# Patient Record
Sex: FEMALE | Race: WHITE | NOT HISPANIC OR LATINO | Employment: OTHER | URBAN - METROPOLITAN AREA
[De-identification: names, ages, dates, MRNs, and addresses within clinical notes are randomized per-mention and may not be internally consistent; named-entity substitution may affect disease eponyms.]

---

## 2017-10-12 ENCOUNTER — HOSPITAL ENCOUNTER (EMERGENCY)
Facility: HOSPITAL | Age: 82
Discharge: HOME | End: 2017-10-12
Attending: EMERGENCY MEDICINE
Payer: MEDICARE

## 2017-10-12 VITALS
RESPIRATION RATE: 18 BRPM | HEART RATE: 86 BPM | WEIGHT: 106 LBS | HEIGHT: 56 IN | SYSTOLIC BLOOD PRESSURE: 168 MMHG | DIASTOLIC BLOOD PRESSURE: 81 MMHG | BODY MASS INDEX: 23.84 KG/M2 | OXYGEN SATURATION: 97 % | TEMPERATURE: 98 F

## 2017-10-12 DIAGNOSIS — R41.82 ALTERED MENTAL STATUS: ICD-10-CM

## 2017-10-12 DIAGNOSIS — N30.01 ACUTE CYSTITIS WITH HEMATURIA: Primary | ICD-10-CM

## 2017-10-12 LAB
ALBUMIN SERPL BCP-MCNC: 3.4 G/DL
ALP SERPL-CCNC: 89 U/L
ALT SERPL W/O P-5'-P-CCNC: 8 U/L
ANION GAP SERPL CALC-SCNC: 11 MMOL/L
AST SERPL-CCNC: 11 U/L
BACTERIA #/AREA URNS HPF: ABNORMAL /HPF
BASOPHILS # BLD AUTO: 0.09 K/UL
BASOPHILS NFR BLD: 0.7 %
BILIRUB SERPL-MCNC: 0.4 MG/DL
BILIRUB UR QL STRIP: NEGATIVE
BUN SERPL-MCNC: 25 MG/DL
CALCIUM SERPL-MCNC: 9.9 MG/DL
CHLORIDE SERPL-SCNC: 94 MMOL/L
CK MB SERPL-MCNC: 0.5 NG/ML
CK MB SERPL-RTO: 2.4 %
CK SERPL-CCNC: 21 U/L
CK SERPL-CCNC: 21 U/L
CLARITY UR: ABNORMAL
CO2 SERPL-SCNC: 25 MMOL/L
COLOR UR: YELLOW
CREAT SERPL-MCNC: 1.4 MG/DL
DIFFERENTIAL METHOD: ABNORMAL
EOSINOPHIL # BLD AUTO: 0.1 K/UL
EOSINOPHIL NFR BLD: 0.5 %
ERYTHROCYTE [DISTWIDTH] IN BLOOD BY AUTOMATED COUNT: 15.2 %
EST. GFR  (AFRICAN AMERICAN): 41 ML/MIN/1.73 M^2
EST. GFR  (NON AFRICAN AMERICAN): 35 ML/MIN/1.73 M^2
GLUCOSE SERPL-MCNC: 144 MG/DL
GLUCOSE UR QL STRIP: NEGATIVE
HCT VFR BLD AUTO: 35.5 %
HGB BLD-MCNC: 11.7 G/DL
HGB UR QL STRIP: ABNORMAL
HYALINE CASTS #/AREA URNS LPF: 0 /LPF
INR PPP: 1
KETONES UR QL STRIP: NEGATIVE
LACTATE SERPL-SCNC: 1.5 MMOL/L
LEUKOCYTE ESTERASE UR QL STRIP: ABNORMAL
LYMPHOCYTES # BLD AUTO: 1.8 K/UL
LYMPHOCYTES NFR BLD: 14.5 %
MCH RBC QN AUTO: 27.3 PG
MCHC RBC AUTO-ENTMCNC: 33 G/DL
MCV RBC AUTO: 83 FL
MICROSCOPIC COMMENT: ABNORMAL
MONOCYTES # BLD AUTO: 1.3 K/UL
MONOCYTES NFR BLD: 10.6 %
NEUTROPHILS # BLD AUTO: 9.2 K/UL
NEUTROPHILS NFR BLD: 73.7 %
NITRITE UR QL STRIP: NEGATIVE
PH UR STRIP: 7 [PH] (ref 5–8)
PLATELET # BLD AUTO: 326 K/UL
PMV BLD AUTO: 9.5 FL
POCT GLUCOSE: 203 MG/DL (ref 70–110)
POTASSIUM SERPL-SCNC: 3.8 MMOL/L
PROT SERPL-MCNC: 8.9 G/DL
PROT UR QL STRIP: ABNORMAL
PROTHROMBIN TIME: 10.7 SEC
RBC # BLD AUTO: 4.29 M/UL
RBC #/AREA URNS HPF: 12 /HPF (ref 0–4)
SODIUM SERPL-SCNC: 130 MMOL/L
SP GR UR STRIP: 1.01 (ref 1–1.03)
TROPONIN I SERPL DL<=0.01 NG/ML-MCNC: 0.01 NG/ML
URN SPEC COLLECT METH UR: ABNORMAL
UROBILINOGEN UR STRIP-ACNC: NEGATIVE EU/DL
WBC # BLD AUTO: 12.52 K/UL
WBC #/AREA URNS HPF: >100 /HPF (ref 0–5)
WBC CLUMPS URNS QL MICRO: ABNORMAL
YEAST URNS QL MICRO: ABNORMAL

## 2017-10-12 PROCEDURE — 80053 COMPREHEN METABOLIC PANEL: CPT

## 2017-10-12 PROCEDURE — 87086 URINE CULTURE/COLONY COUNT: CPT

## 2017-10-12 PROCEDURE — 99284 EMERGENCY DEPT VISIT MOD MDM: CPT | Mod: 25

## 2017-10-12 PROCEDURE — 87077 CULTURE AEROBIC IDENTIFY: CPT

## 2017-10-12 PROCEDURE — 85610 PROTHROMBIN TIME: CPT

## 2017-10-12 PROCEDURE — 87040 BLOOD CULTURE FOR BACTERIA: CPT | Mod: 59

## 2017-10-12 PROCEDURE — 82553 CREATINE MB FRACTION: CPT

## 2017-10-12 PROCEDURE — 83605 ASSAY OF LACTIC ACID: CPT

## 2017-10-12 PROCEDURE — 81000 URINALYSIS NONAUTO W/SCOPE: CPT

## 2017-10-12 PROCEDURE — 82962 GLUCOSE BLOOD TEST: CPT

## 2017-10-12 PROCEDURE — 93010 ELECTROCARDIOGRAM REPORT: CPT | Mod: ,,, | Performed by: INTERNAL MEDICINE

## 2017-10-12 PROCEDURE — 85025 COMPLETE CBC W/AUTO DIFF WBC: CPT

## 2017-10-12 PROCEDURE — 87186 SC STD MICRODIL/AGAR DIL: CPT | Mod: 59

## 2017-10-12 PROCEDURE — 25000003 PHARM REV CODE 250: Performed by: EMERGENCY MEDICINE

## 2017-10-12 PROCEDURE — 87088 URINE BACTERIA CULTURE: CPT

## 2017-10-12 PROCEDURE — 84484 ASSAY OF TROPONIN QUANT: CPT

## 2017-10-12 PROCEDURE — 93005 ELECTROCARDIOGRAM TRACING: CPT

## 2017-10-12 RX ORDER — FLUTICASONE FUROATE AND VILANTEROL 200; 25 UG/1; UG/1
1 POWDER RESPIRATORY (INHALATION)
Status: ON HOLD | COMMUNITY
Start: 2017-05-16 | End: 2019-11-19

## 2017-10-12 RX ORDER — FERROUS SULFATE 324(65)MG
324 TABLET, DELAYED RELEASE (ENTERIC COATED) ORAL 2 TIMES DAILY
Status: ON HOLD | COMMUNITY
Start: 2017-07-13 | End: 2019-11-19

## 2017-10-12 RX ORDER — BENZONATATE 200 MG/1
CAPSULE ORAL
Status: ON HOLD | COMMUNITY
Start: 2017-02-20 | End: 2017-10-16 | Stop reason: HOSPADM

## 2017-10-12 RX ORDER — ALBUTEROL SULFATE 0.83 MG/ML
2.5 SOLUTION RESPIRATORY (INHALATION)
Status: ON HOLD | COMMUNITY
Start: 2016-10-18 | End: 2019-11-19

## 2017-10-12 RX ORDER — CIPROFLOXACIN 250 MG/1
250 TABLET, FILM COATED ORAL 2 TIMES DAILY
Qty: 10 TABLET | Refills: 0 | Status: SHIPPED | OUTPATIENT
Start: 2017-10-12 | End: 2017-10-13

## 2017-10-12 RX ORDER — WARFARIN SODIUM 5 MG/1
TABLET ORAL
COMMUNITY
Start: 2017-06-14 | End: 2017-10-13

## 2017-10-12 RX ORDER — HYOSCYAMINE SULFATE 0.12 MG/1
0.12 TABLET SUBLINGUAL
Status: COMPLETED | OUTPATIENT
Start: 2017-10-12 | End: 2017-10-12

## 2017-10-12 RX ORDER — CARVEDILOL 6.25 MG/1
6.25 TABLET ORAL
Status: ON HOLD | COMMUNITY
Start: 2017-07-13 | End: 2019-11-19

## 2017-10-12 RX ORDER — DARIFENACIN 7.5 MG/1
7.5 TABLET, EXTENDED RELEASE ORAL
COMMUNITY
Start: 2017-08-25 | End: 2017-10-18 | Stop reason: SDUPTHER

## 2017-10-12 RX ORDER — TERCONAZOLE 4 MG/G
1 CREAM VAGINAL
COMMUNITY
Start: 2017-10-10 | End: 2017-10-17

## 2017-10-12 RX ORDER — TOBRAMYCIN AND DEXAMETHASONE 3; 1 MG/ML; MG/ML
SUSPENSION/ DROPS OPHTHALMIC
COMMUNITY
Start: 2017-07-07 | End: 2017-10-18

## 2017-10-12 RX ORDER — ASPIRIN 81 MG/1
81 TABLET ORAL DAILY
COMMUNITY

## 2017-10-12 RX ORDER — HYOSCYAMINE SULFATE 0.12 MG/1
TABLET SUBLINGUAL
Status: ON HOLD | COMMUNITY
Start: 2017-04-03 | End: 2019-11-19

## 2017-10-12 RX ORDER — CIPROFLOXACIN 500 MG/1
500 TABLET ORAL
Status: COMPLETED | OUTPATIENT
Start: 2017-10-12 | End: 2017-10-12

## 2017-10-12 RX ADMIN — CIPROFLOXACIN HYDROCHLORIDE 500 MG: 500 TABLET, FILM COATED ORAL at 02:10

## 2017-10-12 RX ADMIN — HYOSCYAMINE SULFATE 0.12 MG: 0.12 TABLET ORAL at 03:10

## 2017-10-12 NOTE — ED PROVIDER NOTES
"SCRIBE #1 NOTE: I, Ana Maria Gonzalez, am scribing for, and in the presence of, Adelso Dove MD. I have scribed the entire note.      History      Chief Complaint   Patient presents with    Altered Mental Status     Daughter states,"SHe's started talking strange and I checked her sugar and it was high so the doctor told me to bring here to the ER.       Review of patient's allergies indicates:   Allergen Reactions    Demerol [meperidine]     Penicillins         HPI   HPI    10/12/2017, 12:36 PM   History obtained from the patient and family      History of Present Illness: Jessica Hamm is a 82 y.o. female patient who presents to the Emergency Department for AMS (disorientated) which onset gradually this morning. Symptoms are intermittent and moderate in severity.  Family is unclear about how long the episodes last. No mitigating or exacerbating factors reported. Associated sxs include confusion, anxiety, and elevated blood sugar (296). Patient denies any fever, chills, HA, facial droop, weakness/numbness, CP, SOB, and all other sxs at this time. No prior Tx reported. No further complaints or concerns at this time.         Arrival mode: Personal vehicle      PCP: Milo Gil MD       Past Medical History:  Past Medical History:   Diagnosis Date    Cancer     Cervical    COPD (chronic obstructive pulmonary disease)     Diabetes mellitus     Hypertension     Stroke 1995       Past Surgical History:  Past Surgical History:   Procedure Laterality Date    BACK SURGERY      carpal tunnel Bilateral     CHOLECYSTECTOMY      HYSTERECTOMY      Knee arthroscopy Bilateral          Family History:  No family history on file.    Social History:  Social History     Social History Main Topics    Smoking status: Never Smoker    Smokeless tobacco: Never Used    Alcohol use No    Drug use: No    Sexual activity: Not on file       ROS   Review of Systems   Constitutional: Negative for chills and fever.    "     + hyperglycemia    HENT: Negative for sore throat.    Respiratory: Negative for chest tightness and shortness of breath.    Cardiovascular: Negative for chest pain.   Gastrointestinal: Negative for nausea.   Genitourinary: Negative for dysuria.   Musculoskeletal: Negative for back pain.   Skin: Negative for rash.   Neurological: Negative for weakness, numbness and headaches.   Hematological: Does not bruise/bleed easily.   Psychiatric/Behavioral: Positive for confusion. The patient is nervous/anxious.        Physical Exam      Initial Vitals [10/12/17 1212]   BP Pulse Resp Temp SpO2   (!) 157/74 85 20 98.3 °F (36.8 °C) 98 %      MAP       101.67          Physical Exam  Nursing Notes and Vital Signs Reviewed.  Constitutional: Patient is in no apparent distress. Well-developed and well-nourished.  Head: Atraumatic. Normocephalic.  Eyes: PERRL. EOM intact. Conjunctivae are not pale. No scleral icterus.  ENT: Mucous membranes are moist. Oropharynx is clear and symmetric.    Neck: Supple. Full ROM. No lymphadenopathy.  Cardiovascular: Regular rate. Regular rhythm. No murmurs, rubs, or gallops. Distal pulses are 2+ and symmetric.  Pulmonary/Chest: No respiratory distress. Clear to auscultation bilaterally. No wheezing, rales, or rhonchi.  Abdominal: Soft and non-distended.  There is no tenderness.  No rebound, guarding, or rigidity. Good bowel sounds.  Musculoskeletal: Moves all extremities. No obvious deformities. No edema. No calf tenderness.  Skin: Warm and dry.  Neurological:  Mildly confused from baseline.  Pupils ERRL and EOM normal. Cranial nerves II-XII are intact. Strength is full bilaterally; it is equal and 5/5 in bilateral upper and lower extremities. There is no pronator drift of outstretched arms. Light touch sense is intact. Speech is clear and normal. No acute focal neurological deficits noted.  Psychiatric: Normal affect. Good eye contact. Appropriate in content.    ED Course    Procedures  ED Vital  "Signs:  Vitals:    10/12/17 1212   BP: (!) 157/74   Pulse: 85   Resp: 20   Temp: 98.3 °F (36.8 °C)   TempSrc: Oral   SpO2: 98%   Weight: 48.1 kg (106 lb)   Height: 4' 8" (1.422 m)       Abnormal Lab Results:  Labs Reviewed   CBC W/ AUTO DIFFERENTIAL - Abnormal; Notable for the following:        Result Value    Hemoglobin 11.7 (*)     Hematocrit 35.5 (*)     RDW 15.2 (*)     Gran # 9.2 (*)     Mono # 1.3 (*)     Gran% 73.7 (*)     Lymph% 14.5 (*)     All other components within normal limits   COMPREHENSIVE METABOLIC PANEL - Abnormal; Notable for the following:     Sodium 130 (*)     Chloride 94 (*)     Glucose 144 (*)     BUN, Bld 25 (*)     Total Protein 8.9 (*)     Albumin 3.4 (*)     ALT 8 (*)     eGFR if  41 (*)     eGFR if non  35 (*)     All other components within normal limits   URINALYSIS - Abnormal; Notable for the following:     Appearance, UA Hazy (*)     Protein, UA 1+ (*)     Occult Blood UA 3+ (*)     Leukocytes, UA 3+ (*)     All other components within normal limits   URINALYSIS MICROSCOPIC - Abnormal; Notable for the following:     RBC, UA 12 (*)     WBC, UA >100 (*)     WBC Clumps, UA Occasional (*)     Yeast, UA Rare (*)     All other components within normal limits   POCT GLUCOSE - Abnormal; Notable for the following:     POCT Glucose 203 (*)     All other components within normal limits   CULTURE, BLOOD   CULTURE, BLOOD   CULTURE, URINE   PROTIME-INR   TROPONIN I   CK-MB   CK   LACTIC ACID, PLASMA   POCT GLUCOSE MONITORING CONTINUOUS        All Lab Results:  Results for orders placed or performed during the hospital encounter of 10/12/17   CBC auto differential   Result Value Ref Range    WBC 12.52 3.90 - 12.70 K/uL    RBC 4.29 4.00 - 5.40 M/uL    Hemoglobin 11.7 (L) 12.0 - 16.0 g/dL    Hematocrit 35.5 (L) 37.0 - 48.5 %    MCV 83 82 - 98 fL    MCH 27.3 27.0 - 31.0 pg    MCHC 33.0 32.0 - 36.0 g/dL    RDW 15.2 (H) 11.5 - 14.5 %    Platelets 326 150 - 350 K/uL    " MPV 9.5 9.2 - 12.9 fL    Gran # 9.2 (H) 1.8 - 7.7 K/uL    Lymph # 1.8 1.0 - 4.8 K/uL    Mono # 1.3 (H) 0.3 - 1.0 K/uL    Eos # 0.1 0.0 - 0.5 K/uL    Baso # 0.09 0.00 - 0.20 K/uL    Gran% 73.7 (H) 38.0 - 73.0 %    Lymph% 14.5 (L) 18.0 - 48.0 %    Mono% 10.6 4.0 - 15.0 %    Eosinophil% 0.5 0.0 - 8.0 %    Basophil% 0.7 0.0 - 1.9 %    Differential Method Automated    Comprehensive metabolic panel   Result Value Ref Range    Sodium 130 (L) 136 - 145 mmol/L    Potassium 3.8 3.5 - 5.1 mmol/L    Chloride 94 (L) 95 - 110 mmol/L    CO2 25 23 - 29 mmol/L    Glucose 144 (H) 70 - 110 mg/dL    BUN, Bld 25 (H) 8 - 23 mg/dL    Creatinine 1.4 0.5 - 1.4 mg/dL    Calcium 9.9 8.7 - 10.5 mg/dL    Total Protein 8.9 (H) 6.0 - 8.4 g/dL    Albumin 3.4 (L) 3.5 - 5.2 g/dL    Total Bilirubin 0.4 0.1 - 1.0 mg/dL    Alkaline Phosphatase 89 55 - 135 U/L    AST 11 10 - 40 U/L    ALT 8 (L) 10 - 44 U/L    Anion Gap 11 8 - 16 mmol/L    eGFR if African American 41 (A) >60 mL/min/1.73 m^2    eGFR if non African American 35 (A) >60 mL/min/1.73 m^2   Urinalysis   Result Value Ref Range    Specimen UA Urine, Catheterized     Color, UA Yellow Yellow, Straw, Jesi    Appearance, UA Hazy (A) Clear    pH, UA 7.0 5.0 - 8.0    Specific Gravity, UA 1.010 1.005 - 1.030    Protein, UA 1+ (A) Negative    Glucose, UA Negative Negative    Ketones, UA Negative Negative    Bilirubin (UA) Negative Negative    Occult Blood UA 3+ (A) Negative    Nitrite, UA Negative Negative    Urobilinogen, UA Negative <2.0 EU/dL    Leukocytes, UA 3+ (A) Negative   Protime-INR   Result Value Ref Range    Prothrombin Time 10.7 9.0 - 12.5 sec    INR 1.0 0.8 - 1.2   Troponin I   Result Value Ref Range    Troponin I 0.006 0.000 - 0.026 ng/mL   CK-MB   Result Value Ref Range    CPK 21 20 - 180 U/L    CPK MB 0.5 0.1 - 6.5 ng/mL    MB% 2.4 0.0 - 5.0 %   CK   Result Value Ref Range    CPK 21 20 - 180 U/L   Lactic acid, plasma   Result Value Ref Range    Lactate (Lactic Acid) 1.5 0.5 - 2.2 mmol/L    Urinalysis Microscopic   Result Value Ref Range    RBC, UA 12 (H) 0 - 4 /hpf    WBC, UA >100 (H) 0 - 5 /hpf    WBC Clumps, UA Occasional (A) None-Rare    Bacteria, UA Rare None-Occ /hpf    Yeast, UA Rare (A) None    Hyaline Casts, UA 0 0-1/lpf /lpf    Microscopic Comment SEE COMMENT    POCT glucose   Result Value Ref Range    POCT Glucose 203 (H) 70 - 110 mg/dL         Imaging Results:  Imaging Results          CT Head Without Contrast (Final result)  Result time 10/12/17 14:57:05    Final result by Kendrick Edmond MD (10/12/17 14:57:05)                 Impression:      Moderately advanced atrophy.  Old right lacunar infarcts.  No definite acute process.    Mild scattered paranasal sinus mucosal thickening.        All CT scans at this facility use dose modulation, iterative reconstruction and/or weight based dosing when appropriate to reduce radiation dose to as low as reasonably achievable.       Electronically signed by: KENDRICK EDMOND MD  Date:     10/12/17  Time:    14:57              Narrative:    CT HEAD WITHOUT CONTRAST     History:  Altered mental status.  Confusion.  Slurred speech.    Technique:  Noncontrast CT of the brain.     Comparison:  None.     Findings:  The ventricles are dilated consistent with generalized atrophy. Associated age-related white matter degeneration is present.     Several chronic appearing right basal ganglia lacunar type infarcts are present.    Prominent cranial vascular calcification is noted.    No significant acute findings are noted.                             X-Ray Chest 1 View (Final result)  Result time 10/12/17 12:55:14    Final result by MARIZA Hoffmann Sr., MD (10/12/17 12:55:14)                 Impression:        1. There has been interval development of a mild amount of interstitial opacities seen in both lungs. This is characteristic of pulmonary edema.  2. There are surgical clips projected over the right upper quadrant of the abdomen. This is  characteristic of a prior cholecystectomy.  3. The size of the heart is prominent. This may be secondary to magnification.      Electronically signed by: MARIZA SUTHERLAND MD  Date:     10/12/17  Time:    12:55              Narrative:    One-view chest x-ray    Clinical History: Altered mental status, unspecified    Finding: Comparison was made to a prior examination performed on 8/11/2016. The size of the heart is prominent. There has been interval development of a mild amount of interstitial opacities seen in both lungs. There is no pneumothorax.  The costophrenic angles are sharp. There are surgical clips projected over the right upper quadrant of the abdomen.                                  The EKG was ordered, reviewed, and independently interpreted by the ED provider.  Interpretation time: 1248  Rate: 81 BPM  Rhythm: normal sinus rhythm  Interpretation: L axis deviation. No STEMI.      The Emergency Provider reviewed the vital signs and test results, which are outlined above.    ED Discussion   3:03 PM: Reassessed pt at this time.  Pt is A&Ox4 and in NAD. Pt states her condition has improved at this time. Discussed with pt all pertinent ED information and results. Discussed pt dx and plan of tx. Gave pt all f/u and return to the ED instructions. All questions and concerns were addressed at this time. Pt expresses understanding of information and instructions, and is comfortable with plan to discharge. Pt is stable for discharge.      ED Medication(s):  Medications   hyoscyamine SL tablet 0.125 mg (not administered)   ciprofloxacin HCl tablet 500 mg (500 mg Oral Given 10/12/17 1445)       New Prescriptions    No medications on file             Medical Decision Making    Medical Decision Making:   Clinical Tests:   Lab Tests: Reviewed and Ordered  Radiological Study: Reviewed and Ordered  Medical Tests: Ordered and Reviewed           Scribe Attestation:   Scribe #1: I performed the above scribed service and the  documentation accurately describes the services I performed. I attest to the accuracy of the note.    Attending:   Physician Attestation Statement for Scribe #1: I, Adelso Dove MD, personally performed the services described in this documentation, as scribed by Ana Maria Gonzalez, in my presence, and it is both accurate and complete.          Clinical Impression       ICD-10-CM ICD-9-CM   1. Altered mental status R41.82 780.97       Disposition:   Disposition: Discharged  Condition: Stable         Adelso Dove MD  10/12/17 1527       Adelso Dove MD  10/12/17 1387

## 2017-10-13 ENCOUNTER — HOSPITAL ENCOUNTER (INPATIENT)
Facility: HOSPITAL | Age: 82
LOS: 3 days | Discharge: HOME | DRG: 698 | End: 2017-10-16
Attending: EMERGENCY MEDICINE | Admitting: INTERNAL MEDICINE
Payer: MEDICARE

## 2017-10-13 ENCOUNTER — TELEPHONE (OUTPATIENT)
Dept: EMERGENCY MEDICINE | Facility: HOSPITAL | Age: 82
End: 2017-10-13

## 2017-10-13 DIAGNOSIS — R41.82 ALTERED MENTAL STATUS, UNSPECIFIED ALTERED MENTAL STATUS TYPE: ICD-10-CM

## 2017-10-13 DIAGNOSIS — T83.511D URINARY TRACT INFECTION ASSOCIATED WITH CATHETERIZATION OF URINARY TRACT, UNSPECIFIED INDWELLING URINARY CATHETER TYPE, SUBSEQUENT ENCOUNTER: Primary | ICD-10-CM

## 2017-10-13 DIAGNOSIS — A41.50 SEPSIS DUE TO GRAM NEGATIVE BACTERIA: ICD-10-CM

## 2017-10-13 DIAGNOSIS — N39.0 URINARY TRACT INFECTION ASSOCIATED WITH CATHETERIZATION OF URINARY TRACT, UNSPECIFIED INDWELLING URINARY CATHETER TYPE, SUBSEQUENT ENCOUNTER: Primary | ICD-10-CM

## 2017-10-13 PROBLEM — I10 ESSENTIAL HYPERTENSION: Status: ACTIVE | Noted: 2017-10-13

## 2017-10-13 PROBLEM — T83.518A INFECTION DUE TO URETHRAL CATHETER: Status: ACTIVE | Noted: 2017-10-13

## 2017-10-13 PROBLEM — I63.9 CVA (CEREBRAL VASCULAR ACCIDENT): Status: ACTIVE | Noted: 2017-10-13

## 2017-10-13 PROBLEM — E11.69 TYPE 2 DIABETES MELLITUS WITH OTHER SPECIFIED COMPLICATION: Status: ACTIVE | Noted: 2017-10-13

## 2017-10-13 PROBLEM — J44.9 COPD (CHRONIC OBSTRUCTIVE PULMONARY DISEASE): Status: ACTIVE | Noted: 2017-10-13

## 2017-10-13 PROBLEM — R78.81 GRAM-NEGATIVE BACTEREMIA: Status: ACTIVE | Noted: 2017-10-13

## 2017-10-13 LAB
ANION GAP SERPL CALC-SCNC: 14 MMOL/L
BACTERIA #/AREA URNS HPF: ABNORMAL /HPF
BASOPHILS # BLD AUTO: 0.08 K/UL
BASOPHILS NFR BLD: 0.5 %
BILIRUB UR QL STRIP: NEGATIVE
BUN SERPL-MCNC: 28 MG/DL
CALCIUM SERPL-MCNC: 9.9 MG/DL
CHLORIDE SERPL-SCNC: 93 MMOL/L
CLARITY UR: ABNORMAL
CO2 SERPL-SCNC: 22 MMOL/L
COLOR UR: YELLOW
CREAT SERPL-MCNC: 1.3 MG/DL
DIFFERENTIAL METHOD: ABNORMAL
EOSINOPHIL # BLD AUTO: 0.2 K/UL
EOSINOPHIL NFR BLD: 1.2 %
ERYTHROCYTE [DISTWIDTH] IN BLOOD BY AUTOMATED COUNT: 15.1 %
EST. GFR  (AFRICAN AMERICAN): 44 ML/MIN/1.73 M^2
EST. GFR  (NON AFRICAN AMERICAN): 39 ML/MIN/1.73 M^2
GLUCOSE SERPL-MCNC: 113 MG/DL
GLUCOSE UR QL STRIP: NEGATIVE
HCT VFR BLD AUTO: 33.7 %
HGB BLD-MCNC: 11.1 G/DL
HGB UR QL STRIP: ABNORMAL
HYALINE CASTS #/AREA URNS LPF: 0 /LPF
KETONES UR QL STRIP: NEGATIVE
LACTATE SERPL-SCNC: 1.3 MMOL/L
LEUKOCYTE ESTERASE UR QL STRIP: ABNORMAL
LYMPHOCYTES # BLD AUTO: 1.9 K/UL
LYMPHOCYTES NFR BLD: 13.1 %
MCH RBC QN AUTO: 27.1 PG
MCHC RBC AUTO-ENTMCNC: 32.9 G/DL
MCV RBC AUTO: 82 FL
MICROSCOPIC COMMENT: ABNORMAL
MONOCYTES # BLD AUTO: 1.9 K/UL
MONOCYTES NFR BLD: 12.8 %
NEUTROPHILS # BLD AUTO: 10.7 K/UL
NEUTROPHILS NFR BLD: 72.4 %
NITRITE UR QL STRIP: NEGATIVE
PH UR STRIP: 7 [PH] (ref 5–8)
PLATELET # BLD AUTO: 333 K/UL
PMV BLD AUTO: 9.6 FL
POCT GLUCOSE: 176 MG/DL (ref 70–110)
POTASSIUM SERPL-SCNC: 3.9 MMOL/L
PROT UR QL STRIP: ABNORMAL
RBC # BLD AUTO: 4.1 M/UL
RBC #/AREA URNS HPF: 20 /HPF (ref 0–4)
SODIUM SERPL-SCNC: 129 MMOL/L
SP GR UR STRIP: <=1.005 (ref 1–1.03)
URN SPEC COLLECT METH UR: ABNORMAL
UROBILINOGEN UR STRIP-ACNC: NEGATIVE EU/DL
WBC # BLD AUTO: 14.81 K/UL
WBC #/AREA URNS HPF: >100 /HPF (ref 0–5)
YEAST URNS QL MICRO: ABNORMAL

## 2017-10-13 PROCEDURE — 94640 AIRWAY INHALATION TREATMENT: CPT

## 2017-10-13 PROCEDURE — 96372 THER/PROPH/DIAG INJ SC/IM: CPT | Mod: 59

## 2017-10-13 PROCEDURE — 11000001 HC ACUTE MED/SURG PRIVATE ROOM

## 2017-10-13 PROCEDURE — 99285 EMERGENCY DEPT VISIT HI MDM: CPT | Mod: 25

## 2017-10-13 PROCEDURE — 85025 COMPLETE CBC W/AUTO DIFF WBC: CPT

## 2017-10-13 PROCEDURE — 80048 BASIC METABOLIC PNL TOTAL CA: CPT

## 2017-10-13 PROCEDURE — 25000003 PHARM REV CODE 250: Performed by: NURSE PRACTITIONER

## 2017-10-13 PROCEDURE — 25000003 PHARM REV CODE 250: Performed by: EMERGENCY MEDICINE

## 2017-10-13 PROCEDURE — 96365 THER/PROPH/DIAG IV INF INIT: CPT

## 2017-10-13 PROCEDURE — 96361 HYDRATE IV INFUSION ADD-ON: CPT

## 2017-10-13 PROCEDURE — 63600175 PHARM REV CODE 636 W HCPCS: Performed by: EMERGENCY MEDICINE

## 2017-10-13 PROCEDURE — 87086 URINE CULTURE/COLONY COUNT: CPT

## 2017-10-13 PROCEDURE — 83605 ASSAY OF LACTIC ACID: CPT

## 2017-10-13 PROCEDURE — 25000242 PHARM REV CODE 250 ALT 637 W/ HCPCS: Performed by: NURSE PRACTITIONER

## 2017-10-13 PROCEDURE — 87088 URINE BACTERIA CULTURE: CPT

## 2017-10-13 PROCEDURE — 82962 GLUCOSE BLOOD TEST: CPT

## 2017-10-13 PROCEDURE — 83036 HEMOGLOBIN GLYCOSYLATED A1C: CPT

## 2017-10-13 PROCEDURE — 63600175 PHARM REV CODE 636 W HCPCS: Performed by: NURSE PRACTITIONER

## 2017-10-13 PROCEDURE — S0073 INJECTION, AZTREONAM, 500 MG: HCPCS | Performed by: EMERGENCY MEDICINE

## 2017-10-13 PROCEDURE — 87106 FUNGI IDENTIFICATION YEAST: CPT

## 2017-10-13 PROCEDURE — 87040 BLOOD CULTURE FOR BACTERIA: CPT | Mod: 59

## 2017-10-13 PROCEDURE — 96366 THER/PROPH/DIAG IV INF ADDON: CPT

## 2017-10-13 PROCEDURE — 81000 URINALYSIS NONAUTO W/SCOPE: CPT

## 2017-10-13 PROCEDURE — 96367 TX/PROPH/DG ADDL SEQ IV INF: CPT

## 2017-10-13 PROCEDURE — 96368 THER/DIAG CONCURRENT INF: CPT

## 2017-10-13 RX ORDER — METRONIDAZOLE 500 MG/100ML
500 INJECTION, SOLUTION INTRAVENOUS
Status: DISCONTINUED | OUTPATIENT
Start: 2017-10-13 | End: 2017-10-13 | Stop reason: SDUPTHER

## 2017-10-13 RX ORDER — HYOSCYAMINE SULFATE 0.12 MG/1
0.12 TABLET SUBLINGUAL EVERY 4 HOURS PRN
Status: DISCONTINUED | OUTPATIENT
Start: 2017-10-13 | End: 2017-10-16 | Stop reason: HOSPADM

## 2017-10-13 RX ORDER — METRONIDAZOLE 500 MG/100ML
1000 INJECTION, SOLUTION INTRAVENOUS ONCE
Status: DISCONTINUED | OUTPATIENT
Start: 2017-10-13 | End: 2017-10-13

## 2017-10-13 RX ORDER — CARVEDILOL 3.12 MG/1
6.25 TABLET ORAL 2 TIMES DAILY WITH MEALS
Status: DISCONTINUED | OUTPATIENT
Start: 2017-10-14 | End: 2017-10-16 | Stop reason: HOSPADM

## 2017-10-13 RX ORDER — BUDESONIDE 0.5 MG/2ML
0.5 INHALANT ORAL 2 TIMES DAILY
Status: DISCONTINUED | OUTPATIENT
Start: 2017-10-13 | End: 2017-10-16 | Stop reason: HOSPADM

## 2017-10-13 RX ORDER — GLUCAGON 1 MG
1 KIT INJECTION
Status: DISCONTINUED | OUTPATIENT
Start: 2017-10-13 | End: 2017-10-16 | Stop reason: HOSPADM

## 2017-10-13 RX ORDER — ASPIRIN 81 MG/1
81 TABLET ORAL DAILY
Status: DISCONTINUED | OUTPATIENT
Start: 2017-10-14 | End: 2017-10-16 | Stop reason: HOSPADM

## 2017-10-13 RX ORDER — AMLODIPINE BESYLATE 10 MG/1
10 TABLET ORAL DAILY
Status: DISCONTINUED | OUTPATIENT
Start: 2017-10-14 | End: 2017-10-16 | Stop reason: HOSPADM

## 2017-10-13 RX ORDER — INSULIN ASPART 100 [IU]/ML
0-5 INJECTION, SOLUTION INTRAVENOUS; SUBCUTANEOUS
Status: DISCONTINUED | OUTPATIENT
Start: 2017-10-13 | End: 2017-10-16 | Stop reason: HOSPADM

## 2017-10-13 RX ORDER — SERTRALINE HYDROCHLORIDE 50 MG/1
50 TABLET, FILM COATED ORAL DAILY
Status: DISCONTINUED | OUTPATIENT
Start: 2017-10-14 | End: 2017-10-16 | Stop reason: HOSPADM

## 2017-10-13 RX ORDER — IPRATROPIUM BROMIDE AND ALBUTEROL SULFATE 2.5; .5 MG/3ML; MG/3ML
3 SOLUTION RESPIRATORY (INHALATION)
Status: DISCONTINUED | OUTPATIENT
Start: 2017-10-13 | End: 2017-10-14

## 2017-10-13 RX ORDER — HEPARIN SODIUM 5000 [USP'U]/ML
5000 INJECTION, SOLUTION INTRAVENOUS; SUBCUTANEOUS EVERY 8 HOURS
Status: DISCONTINUED | OUTPATIENT
Start: 2017-10-13 | End: 2017-10-16 | Stop reason: HOSPADM

## 2017-10-13 RX ORDER — FORMOTEROL FUMARATE DIHYDRATE 20 UG/2ML
20 SOLUTION RESPIRATORY (INHALATION) EVERY 12 HOURS
Status: DISCONTINUED | OUTPATIENT
Start: 2017-10-14 | End: 2017-10-13

## 2017-10-13 RX ORDER — PANTOPRAZOLE SODIUM 40 MG/1
40 TABLET, DELAYED RELEASE ORAL DAILY
Status: DISCONTINUED | OUTPATIENT
Start: 2017-10-13 | End: 2017-10-16 | Stop reason: HOSPADM

## 2017-10-13 RX ORDER — IBUPROFEN 200 MG
16 TABLET ORAL
Status: DISCONTINUED | OUTPATIENT
Start: 2017-10-13 | End: 2017-10-16 | Stop reason: HOSPADM

## 2017-10-13 RX ORDER — ARFORMOTEROL TARTRATE 15 UG/2ML
15 SOLUTION RESPIRATORY (INHALATION) 2 TIMES DAILY
Status: DISCONTINUED | OUTPATIENT
Start: 2017-10-13 | End: 2017-10-16 | Stop reason: HOSPADM

## 2017-10-13 RX ORDER — IBUPROFEN 200 MG
24 TABLET ORAL
Status: DISCONTINUED | OUTPATIENT
Start: 2017-10-13 | End: 2017-10-16 | Stop reason: HOSPADM

## 2017-10-13 RX ORDER — SODIUM CHLORIDE 9 MG/ML
500 INJECTION, SOLUTION INTRAVENOUS
Status: COMPLETED | OUTPATIENT
Start: 2017-10-13 | End: 2017-10-13

## 2017-10-13 RX ORDER — FERROUS SULFATE 325(65) MG
324 TABLET, DELAYED RELEASE (ENTERIC COATED) ORAL 2 TIMES DAILY
Status: DISCONTINUED | OUTPATIENT
Start: 2017-10-13 | End: 2017-10-16 | Stop reason: HOSPADM

## 2017-10-13 RX ORDER — CIPROFLOXACIN 2 MG/ML
400 INJECTION, SOLUTION INTRAVENOUS
Status: COMPLETED | OUTPATIENT
Start: 2017-10-13 | End: 2017-10-13

## 2017-10-13 RX ORDER — SODIUM CHLORIDE 9 MG/ML
INJECTION, SOLUTION INTRAVENOUS CONTINUOUS
Status: DISCONTINUED | OUTPATIENT
Start: 2017-10-13 | End: 2017-10-16 | Stop reason: HOSPADM

## 2017-10-13 RX ORDER — METRONIDAZOLE 500 MG/100ML
500 INJECTION, SOLUTION INTRAVENOUS
Status: DISCONTINUED | OUTPATIENT
Start: 2017-10-14 | End: 2017-10-16 | Stop reason: HOSPADM

## 2017-10-13 RX ORDER — PRAVASTATIN SODIUM 10 MG/1
10 TABLET ORAL DAILY
Status: DISCONTINUED | OUTPATIENT
Start: 2017-10-14 | End: 2017-10-16 | Stop reason: HOSPADM

## 2017-10-13 RX ORDER — METRONIDAZOLE 500 MG/100ML
1000 INJECTION, SOLUTION INTRAVENOUS ONCE
Status: COMPLETED | OUTPATIENT
Start: 2017-10-14 | End: 2017-10-14

## 2017-10-13 RX ADMIN — SODIUM CHLORIDE 500 ML: 900 INJECTION, SOLUTION INTRAVENOUS at 05:10

## 2017-10-13 RX ADMIN — PANTOPRAZOLE SODIUM 40 MG: 40 TABLET, DELAYED RELEASE ORAL at 09:10

## 2017-10-13 RX ADMIN — FERROUS SULFATE TAB EC 325 MG (65 MG FE EQUIVALENT) 325 MG: 325 (65 FE) TABLET DELAYED RESPONSE at 11:10

## 2017-10-13 RX ADMIN — ARFORMOTEROL TARTRATE 15 MCG: 15 SOLUTION RESPIRATORY (INHALATION) at 09:10

## 2017-10-13 RX ADMIN — BUDESONIDE 0.5 MG: 0.5 SUSPENSION RESPIRATORY (INHALATION) at 09:10

## 2017-10-13 RX ADMIN — IPRATROPIUM BROMIDE AND ALBUTEROL SULFATE 3 ML: .5; 3 SOLUTION RESPIRATORY (INHALATION) at 09:10

## 2017-10-13 RX ADMIN — AZTREONAM 500 MG: 1 INJECTION, POWDER, LYOPHILIZED, FOR SOLUTION INTRAMUSCULAR; INTRAVENOUS at 09:10

## 2017-10-13 RX ADMIN — HEPARIN SODIUM 5000 UNITS: 5000 INJECTION, SOLUTION INTRAVENOUS; SUBCUTANEOUS at 09:10

## 2017-10-13 RX ADMIN — SODIUM CHLORIDE: 0.9 INJECTION, SOLUTION INTRAVENOUS at 09:10

## 2017-10-13 RX ADMIN — CIPROFLOXACIN 400 MG: 2 INJECTION, SOLUTION INTRAVENOUS at 05:10

## 2017-10-13 RX ADMIN — METRONIDAZOLE 1000 MG: 500 INJECTION, SOLUTION INTRAVENOUS at 11:10

## 2017-10-13 RX ADMIN — SODIUM CHLORIDE 1443 ML: 0.9 INJECTION, SOLUTION INTRAVENOUS at 08:10

## 2017-10-13 NOTE — TELEPHONE ENCOUNTER
----- Message from Sukhjinder Cash MD sent at 10/13/2017 11:19 AM CDT -----  Patient with positive blood culture and UTI.  Please have patient return to the ED for reevaluation.

## 2017-10-13 NOTE — ED PROVIDER NOTES
"SCRIBE #1 NOTE: I, Christina Phillips, am scribing for, and in the presence of, Adelso Dove MD. I have scribed the entire note.      History      Chief Complaint   Patient presents with    Urinary Tract Infection     pt called to come back to ED for positive blood cultures and UTI       Review of patient's allergies indicates:   Allergen Reactions    Demerol [meperidine] Rash    Penicillins Hives and Rash     "EVEN THE ROOF OF MY MOUTH BROKE OUT"        HPI   HPI    10/13/2017, 4:14 PM   History obtained from the family and patient      History of Present Illness: Jessica Hamm is a 82 y.o. female patient who was called and informed to come to ED for re-evaluation after her blood cultures from yesterday's ED visit resulted positive and for her UTI. Pt was seen in ED yesterday for confusion and blood cultures were obtained. She was dx with UTI and discharged with a Rx for Cipro. Patient states that she has lower back pain that extends to her L flank and had one episode of nausea in ED lobby. Family states that pt started taking her Cipro and still has some mild confusion. Per family, patient was on the side of the bed talking last night but no one else was in the room. They report that patient said she was talking to her  who was sleeping in another room at that time. Symptoms are constant and moderate in severity. No mitigating or exacerbating factors reported. Patient/family denies fever, chills, vomiting, diarrhea, constipation, dizziness, HA, light-headedness, facial asymmetry, slurred speech, and all other sxs at this time. No further complaints or concerns at this time.       Arrival mode: Personal vehicle    PCP: Milo Gil MD       Past Medical History:  Past Medical History:   Diagnosis Date    Cancer 1974    Cervical    COPD (chronic obstructive pulmonary disease)     Diabetes mellitus     Hypertension     Stroke 1995       Past Surgical History:  Past Surgical History: "   Procedure Laterality Date    BACK SURGERY      carpal tunnel Bilateral     CHOLECYSTECTOMY      HYSTERECTOMY      Knee arthroscopy Bilateral          Family History:  History reviewed. No pertinent family history.    Social History:  Social History     Social History Main Topics    Smoking status: Never Smoker    Smokeless tobacco: Never Used    Alcohol use No    Drug use: No    Sexual activity: Unknown       ROS   Review of Systems   Constitutional: Negative for chills, diaphoresis and fever.   HENT: Negative for sore throat.    Respiratory: Negative for shortness of breath.    Cardiovascular: Negative for chest pain.   Gastrointestinal: Positive for nausea (one episode). Negative for abdominal distention, blood in stool, constipation, diarrhea and vomiting.   Genitourinary: Negative for dysuria and hematuria.   Musculoskeletal: Positive for back pain. Negative for neck pain.   Skin: Negative for rash.   Neurological: Negative for dizziness, syncope, facial asymmetry, speech difficulty, weakness, light-headedness, numbness and headaches.   Hematological: Does not bruise/bleed easily.   Psychiatric/Behavioral: Positive for confusion (per family) and hallucinations. Negative for sleep disturbance.   All other systems reviewed and are negative.      Physical Exam      Initial Vitals [10/13/17 1428]   BP Pulse Resp Temp SpO2   (!) 150/80 82 18 98.2 °F (36.8 °C) 97 %      MAP       103.33          Physical Exam  Nursing Notes and Vital Signs Reviewed.  Constitutional: Patient is in no acute distress. Awake and alert. Well-developed and well-nourished.  Head: Atraumatic. Normocephalic.  Eyes: PERRL. EOM intact. Conjunctivae are not pale. No scleral icterus.  ENT: Mucous membranes are moist. Oropharynx is clear and symmetric.    Neck: Supple. Full ROM. No lymphadenopathy.  Cardiovascular: Regular rate. Regular rhythm. No murmurs, rubs, or gallops. Distal pulses are 2+ and symmetric.  Pulmonary/Chest: No  respiratory distress. Clear to auscultation bilaterally. No wheezing, rales, or rhonchi.  Abdominal: Soft and non-distended.  There is no tenderness.  No rebound, guarding, or rigidity.    : No CVA tenderness  Musculoskeletal: Moves all extremities. No obvious deformities. No edema. No calf tenderness. No back tenderness. No midline bony tenderness, deformities, or step-offs of the T-spine or L-spine.   Skin: Warm and dry.  Neurological:  Alert, awake, oriented, and appropriate. Normal speech. Grossly neurologically intact.   Psychiatric: Normal affect. Good eye contact. Appropriate in content.    ED Course    Procedures  ED Vital Signs:  Vitals:    10/13/17 1428 10/13/17 1752   BP: (!) 150/80 (!) 158/73   Pulse: 82 95   Resp: 18 18   Temp: 98.2 °F (36.8 °C) 98.8 °F (37.1 °C)   TempSrc: Oral Oral   SpO2: 97% 96%   Weight: 48.1 kg (106 lb)        Abnormal Lab Results:  Labs Reviewed   CBC W/ AUTO DIFFERENTIAL - Abnormal; Notable for the following:        Result Value    WBC 14.81 (*)     Hemoglobin 11.1 (*)     Hematocrit 33.7 (*)     RDW 15.1 (*)     Gran # 10.7 (*)     Mono # 1.9 (*)     Lymph% 13.1 (*)     All other components within normal limits   BASIC METABOLIC PANEL - Abnormal; Notable for the following:     Sodium 129 (*)     Chloride 93 (*)     CO2 22 (*)     Glucose 113 (*)     BUN, Bld 28 (*)     eGFR if  44 (*)     eGFR if non  39 (*)     All other components within normal limits   URINALYSIS - Abnormal; Notable for the following:     Appearance, UA Cloudy (*)     Specific Gravity, UA <=1.005 (*)     Protein, UA 1+ (*)     Occult Blood UA 3+ (*)     Leukocytes, UA 3+ (*)     All other components within normal limits   URINALYSIS MICROSCOPIC - Abnormal; Notable for the following:     RBC, UA 20 (*)     WBC, UA >100 (*)     Bacteria, UA Moderate (*)     Yeast, UA Many (*)     All other components within normal limits   CULTURE, BLOOD   CULTURE, BLOOD   CULTURE, URINE    LACTIC ACID, PLASMA        All Lab Results:  Results for orders placed or performed during the hospital encounter of 10/13/17   CBC auto differential   Result Value Ref Range    WBC 14.81 (H) 3.90 - 12.70 K/uL    RBC 4.10 4.00 - 5.40 M/uL    Hemoglobin 11.1 (L) 12.0 - 16.0 g/dL    Hematocrit 33.7 (L) 37.0 - 48.5 %    MCV 82 82 - 98 fL    MCH 27.1 27.0 - 31.0 pg    MCHC 32.9 32.0 - 36.0 g/dL    RDW 15.1 (H) 11.5 - 14.5 %    Platelets 333 150 - 350 K/uL    MPV 9.6 9.2 - 12.9 fL    Gran # 10.7 (H) 1.8 - 7.7 K/uL    Lymph # 1.9 1.0 - 4.8 K/uL    Mono # 1.9 (H) 0.3 - 1.0 K/uL    Eos # 0.2 0.0 - 0.5 K/uL    Baso # 0.08 0.00 - 0.20 K/uL    Gran% 72.4 38.0 - 73.0 %    Lymph% 13.1 (L) 18.0 - 48.0 %    Mono% 12.8 4.0 - 15.0 %    Eosinophil% 1.2 0.0 - 8.0 %    Basophil% 0.5 0.0 - 1.9 %    Differential Method Automated    Basic metabolic panel   Result Value Ref Range    Sodium 129 (L) 136 - 145 mmol/L    Potassium 3.9 3.5 - 5.1 mmol/L    Chloride 93 (L) 95 - 110 mmol/L    CO2 22 (L) 23 - 29 mmol/L    Glucose 113 (H) 70 - 110 mg/dL    BUN, Bld 28 (H) 8 - 23 mg/dL    Creatinine 1.3 0.5 - 1.4 mg/dL    Calcium 9.9 8.7 - 10.5 mg/dL    Anion Gap 14 8 - 16 mmol/L    eGFR if African American 44 (A) >60 mL/min/1.73 m^2    eGFR if non African American 39 (A) >60 mL/min/1.73 m^2   Lactic acid, plasma   Result Value Ref Range    Lactate (Lactic Acid) 1.3 0.5 - 2.2 mmol/L   Urinalysis   Result Value Ref Range    Specimen UA Urine, Catheterized     Color, UA Yellow Yellow, Straw, Jesi    Appearance, UA Cloudy (A) Clear    pH, UA 7.0 5.0 - 8.0    Specific Gravity, UA <=1.005 (A) 1.005 - 1.030    Protein, UA 1+ (A) Negative    Glucose, UA Negative Negative    Ketones, UA Negative Negative    Bilirubin (UA) Negative Negative    Occult Blood UA 3+ (A) Negative    Nitrite, UA Negative Negative    Urobilinogen, UA Negative <2.0 EU/dL    Leukocytes, UA 3+ (A) Negative   Urinalysis Microscopic   Result Value Ref Range    RBC, UA 20 (H) 0 - 4  /hpf    WBC, UA >100 (H) 0 - 5 /hpf    Bacteria, UA Moderate (A) None-Occ /hpf    Yeast, UA Many (A) None    Hyaline Casts, UA 0 0-1/lpf /lpf    Microscopic Comment SEE COMMENT      The Emergency Provider reviewed the vital signs and test results, which are outlined above.    ED Discussion     7:12 PM: Discussed case with Zenobia Lemons NP ([facility]).   Dr. Weiss agrees with current care and management of pt and accepts admission.   Admitting Service: Hospital medicine   Admitting Physician: Dr. Weiss  Admit to: Med-surg    7:26 PM: Re-evaluated pt. I have discussed test results, shared treatment plan, and the need for admission with patient and family at bedside. Pt and family express understanding at this time and agree with all information. All questions answered. Pt and family have no further questions or concerns at this time. Pt is ready for admit.    ED Medication(s):  Medications   0.9%  NaCl infusion (500 mLs Intravenous New Bag 10/13/17 8869)   ciprofloxacin (CIPRO)400mg/200ml D5W IVPB 400 mg (0 mg Intravenous Stopped 10/13/17 1846)       New Prescriptions    No medications on file            Medical Decision Making    Medical Decision Making:   Clinical Tests:   Lab Tests: Ordered and Reviewed           Scribe Attestation:   Scribe #1: I performed the above scribed service and the documentation accurately describes the services I performed. I attest to the accuracy of the note.    Attending:   Physician Attestation Statement for Scribe #1: I, Adelso Dove MD, personally performed the services described in this documentation, as scribed by Christina Phillips, in my presence, and it is both accurate and complete.          Clinical Impression     No diagnosis found.    Disposition:   Disposition: Admitted  Condition: Fair         Adelso Dove MD  10/13/17 7446

## 2017-10-13 NOTE — ED NOTES
Urine obtained from existing arroyo. Arroyo was clamped for 20 minutes and then urine was obtained from port after cleaning the hub with chloraprep.

## 2017-10-13 NOTE — TELEPHONE ENCOUNTER
Spoke to family member and she verbalized understanding to bring patient back to ED for reevaluation.

## 2017-10-14 LAB
BASOPHILS # BLD AUTO: 0.07 K/UL
BASOPHILS NFR BLD: 0.8 %
DIFFERENTIAL METHOD: ABNORMAL
EOSINOPHIL # BLD AUTO: 0.2 K/UL
EOSINOPHIL NFR BLD: 1.8 %
ERYTHROCYTE [DISTWIDTH] IN BLOOD BY AUTOMATED COUNT: 15.1 %
ESTIMATED AVG GLUCOSE: 151 MG/DL
HBA1C MFR BLD HPLC: 6.9 %
HCT VFR BLD AUTO: 30.4 %
HGB BLD-MCNC: 9.8 G/DL
LACTATE SERPL-SCNC: 0.7 MMOL/L
LACTATE SERPL-SCNC: 1.1 MMOL/L
LYMPHOCYTES # BLD AUTO: 1.4 K/UL
LYMPHOCYTES NFR BLD: 17.1 %
MCH RBC QN AUTO: 26.7 PG
MCHC RBC AUTO-ENTMCNC: 32.2 G/DL
MCV RBC AUTO: 83 FL
MONOCYTES # BLD AUTO: 0.9 K/UL
MONOCYTES NFR BLD: 11.2 %
NEUTROPHILS # BLD AUTO: 5.8 K/UL
NEUTROPHILS NFR BLD: 69.1 %
PLATELET # BLD AUTO: 309 K/UL
PMV BLD AUTO: 9.4 FL
POCT GLUCOSE: 166 MG/DL (ref 70–110)
POCT GLUCOSE: 259 MG/DL (ref 70–110)
RBC # BLD AUTO: 3.67 M/UL
WBC # BLD AUTO: 8.34 K/UL

## 2017-10-14 PROCEDURE — 85025 COMPLETE CBC W/AUTO DIFF WBC: CPT

## 2017-10-14 PROCEDURE — 25000003 PHARM REV CODE 250: Performed by: EMERGENCY MEDICINE

## 2017-10-14 PROCEDURE — 25000003 PHARM REV CODE 250: Performed by: NURSE PRACTITIONER

## 2017-10-14 PROCEDURE — 96372 THER/PROPH/DIAG INJ SC/IM: CPT

## 2017-10-14 PROCEDURE — 25000242 PHARM REV CODE 250 ALT 637 W/ HCPCS: Performed by: NURSE PRACTITIONER

## 2017-10-14 PROCEDURE — 63600175 PHARM REV CODE 636 W HCPCS: Performed by: EMERGENCY MEDICINE

## 2017-10-14 PROCEDURE — 94640 AIRWAY INHALATION TREATMENT: CPT

## 2017-10-14 PROCEDURE — 36415 COLL VENOUS BLD VENIPUNCTURE: CPT

## 2017-10-14 PROCEDURE — S0073 INJECTION, AZTREONAM, 500 MG: HCPCS | Performed by: EMERGENCY MEDICINE

## 2017-10-14 PROCEDURE — 11000001 HC ACUTE MED/SURG PRIVATE ROOM

## 2017-10-14 PROCEDURE — S0030 INJECTION, METRONIDAZOLE: HCPCS | Performed by: EMERGENCY MEDICINE

## 2017-10-14 PROCEDURE — S0030 INJECTION, METRONIDAZOLE: HCPCS | Performed by: NURSE PRACTITIONER

## 2017-10-14 PROCEDURE — 63600175 PHARM REV CODE 636 W HCPCS: Performed by: NURSE PRACTITIONER

## 2017-10-14 PROCEDURE — 83605 ASSAY OF LACTIC ACID: CPT

## 2017-10-14 PROCEDURE — 83605 ASSAY OF LACTIC ACID: CPT | Mod: 91

## 2017-10-14 PROCEDURE — 25000003 PHARM REV CODE 250

## 2017-10-14 RX ORDER — IPRATROPIUM BROMIDE AND ALBUTEROL SULFATE 2.5; .5 MG/3ML; MG/3ML
3 SOLUTION RESPIRATORY (INHALATION) EVERY 6 HOURS PRN
Status: DISCONTINUED | OUTPATIENT
Start: 2017-10-14 | End: 2017-10-16 | Stop reason: HOSPADM

## 2017-10-14 RX ORDER — OLANZAPINE 5 MG/1
5 TABLET ORAL ONCE
Status: COMPLETED | OUTPATIENT
Start: 2017-10-14 | End: 2017-10-14

## 2017-10-14 RX ORDER — ACETAMINOPHEN 325 MG/1
650 TABLET ORAL EVERY 6 HOURS PRN
Status: DISCONTINUED | OUTPATIENT
Start: 2017-10-14 | End: 2017-10-16 | Stop reason: HOSPADM

## 2017-10-14 RX ADMIN — OLANZAPINE 5 MG: 5 TABLET, FILM COATED ORAL at 09:10

## 2017-10-14 RX ADMIN — HEPARIN SODIUM 5000 UNITS: 5000 INJECTION, SOLUTION INTRAVENOUS; SUBCUTANEOUS at 01:10

## 2017-10-14 RX ADMIN — BUDESONIDE 0.5 MG: 0.5 SUSPENSION RESPIRATORY (INHALATION) at 07:10

## 2017-10-14 RX ADMIN — PRAVASTATIN SODIUM 10 MG: 10 TABLET ORAL at 09:10

## 2017-10-14 RX ADMIN — INSULIN ASPART 1 UNITS: 100 INJECTION, SOLUTION INTRAVENOUS; SUBCUTANEOUS at 09:10

## 2017-10-14 RX ADMIN — AMLODIPINE BESYLATE 10 MG: 10 TABLET ORAL at 08:10

## 2017-10-14 RX ADMIN — ACETAMINOPHEN 650 MG: 325 TABLET ORAL at 02:10

## 2017-10-14 RX ADMIN — METRONIDAZOLE 500 MG: 500 INJECTION, SOLUTION INTRAVENOUS at 03:10

## 2017-10-14 RX ADMIN — PANTOPRAZOLE SODIUM 40 MG: 40 TABLET, DELAYED RELEASE ORAL at 08:10

## 2017-10-14 RX ADMIN — FERROUS SULFATE TAB EC 325 MG (65 MG FE EQUIVALENT) 325 MG: 325 (65 FE) TABLET DELAYED RESPONSE at 09:10

## 2017-10-14 RX ADMIN — HEPARIN SODIUM 5000 UNITS: 5000 INJECTION, SOLUTION INTRAVENOUS; SUBCUTANEOUS at 09:10

## 2017-10-14 RX ADMIN — ARFORMOTEROL TARTRATE 15 MCG: 15 SOLUTION RESPIRATORY (INHALATION) at 09:10

## 2017-10-14 RX ADMIN — CARVEDILOL 6.25 MG: 3.12 TABLET, FILM COATED ORAL at 08:10

## 2017-10-14 RX ADMIN — HYOSCYAMINE SULFATE 0.12 MG: 0.12 TABLET ORAL at 05:10

## 2017-10-14 RX ADMIN — BUDESONIDE 0.5 MG: 0.5 SUSPENSION RESPIRATORY (INHALATION) at 09:10

## 2017-10-14 RX ADMIN — HEPARIN SODIUM 5000 UNITS: 5000 INJECTION, SOLUTION INTRAVENOUS; SUBCUTANEOUS at 05:10

## 2017-10-14 RX ADMIN — IPRATROPIUM BROMIDE AND ALBUTEROL SULFATE 3 ML: .5; 3 SOLUTION RESPIRATORY (INHALATION) at 01:10

## 2017-10-14 RX ADMIN — METRONIDAZOLE 500 MG: 500 INJECTION, SOLUTION INTRAVENOUS at 08:10

## 2017-10-14 RX ADMIN — ARFORMOTEROL TARTRATE 15 MCG: 15 SOLUTION RESPIRATORY (INHALATION) at 07:10

## 2017-10-14 RX ADMIN — ASPIRIN 81 MG: 81 TABLET, COATED ORAL at 08:10

## 2017-10-14 RX ADMIN — CARVEDILOL 6.25 MG: 3.12 TABLET, FILM COATED ORAL at 05:10

## 2017-10-14 RX ADMIN — SERTRALINE HYDROCHLORIDE 50 MG: 50 TABLET ORAL at 08:10

## 2017-10-14 RX ADMIN — AZTREONAM 500 MG: 1 INJECTION, POWDER, LYOPHILIZED, FOR SOLUTION INTRAMUSCULAR; INTRAVENOUS at 09:10

## 2017-10-14 RX ADMIN — IPRATROPIUM BROMIDE AND ALBUTEROL SULFATE 3 ML: .5; 3 SOLUTION RESPIRATORY (INHALATION) at 07:10

## 2017-10-14 NOTE — ED NOTES
[facility] medicine paged concerning patient request for tylenol for mild pain, verbal order placed into epic, see MAR.

## 2017-10-14 NOTE — ASSESSMENT & PLAN NOTE
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

## 2017-10-14 NOTE — PROGRESS NOTES
[facility] Hospital Medicine  Progress Note    Patient Name: Jessica Hamm  MRN: [id number]  Patient Class: IP- Inpatient   Admission Date: 10/13/2017  Length of Stay: 1 days  Attending Physician: Christy Weiss MD  Primary Care Provider: Milo Gil MD        Subjective:     Principal Problem:Sepsis due to Gram negative bacteria    HPI:  Jessica Hamm is a 83 yo female with chronic indwelling cath, DM II, HTN, COPD, HPL, remote hx of CVA and hx of PE who was seen in the ED yesterday and found to have UTI and discharged with prescription for Cipro. Pt was called today with + blood cultures with gram negative bacteria. Yesterday, pt was noted to be disoriented and not her usual self. CT of Head was negative for acute findings. Today, the granddaughter states her mind is more clear. They deny fever, chills, chest pain, SOB, cough and palpitations. Pt describes right sided lumbar pain that radiates toward the pelvis - this is a new pain.  Labs note leukocytosis, normocytic anemia, hyponatremia & decreased GFR. U/A finds > 100 WBC. Pt is admitted for treatment of gram negative bacteremia with IV ABX - urine culture/blood cultures pending. CT ABD/pelvis pending.     Hospital Course:  82 year old female admitted for Sepsis due to Gram negative bacteria. In ED, labs revealed leukocytosis, normocytic anemia, hyponatremia & decreased GFR. U/A finds > 100 WBC. Urine culture/blood cultures pending. Sepsis protocol activated in the ED. IV abx started. CT ABD/pelvis revealed bilateral hydronephrosis.There is gas noted in the collecting systems bilaterally which could be related to gas forming infection. Patient remains afebrile.     Interval History: Patient reports flank pain has improved. Will continue current management plan.     Review of Systems   Constitutional: Positive for appetite change. Negative for activity change, chills, diaphoresis, fatigue and fever.   HENT: Negative.    Eyes:  Negative for pain, redness and visual disturbance.   Respiratory: Negative for cough, shortness of breath and wheezing.    Cardiovascular: Negative for chest pain, palpitations and leg swelling.   Gastrointestinal: Positive for abdominal pain, nausea and vomiting. Negative for anal bleeding, blood in stool, constipation and diarrhea.   Genitourinary: Negative for dysuria, frequency and hematuria.        Incontinence requiring indwelling cath   Musculoskeletal: Positive for back pain. Negative for arthralgias and myalgias.   Skin: Negative for pallor, rash and wound.   Neurological: Negative for dizziness, weakness, light-headedness and headaches.   Psychiatric/Behavioral: Positive for confusion.     Objective:     Vital Signs (Most Recent):  Temp: 98.2 °F (36.8 °C) (10/14/17 1245)  Pulse: 78 (10/14/17 1307)  Resp: 17 (10/14/17 1307)  BP: (!) 156/72 (10/14/17 1245)  SpO2: 95 % (10/14/17 1307) Vital Signs (24h Range):  Temp:  [98.2 °F (36.8 °C)-98.8 °F (37.1 °C)] 98.2 °F (36.8 °C)  Pulse:  [73-95] 78  Resp:  [16-20] 17  SpO2:  [95 %-99 %] 95 %  BP: (115-165)/() 156/72     Weight: 51.6 kg (113 lb 12.1 oz)  Body mass index is 24.62 kg/m².    Intake/Output Summary (Last 24 hours) at 10/14/17 1542  Last data filed at 10/14/17 1535   Gross per 24 hour   Intake          3309.25 ml   Output             2325 ml   Net           984.25 ml      Physical Exam   Constitutional: She is oriented to person, place, and time. She appears well-developed and well-nourished.   HENT:   Head: Normocephalic and atraumatic.   Nose: Nose normal.   Mouth/Throat: Oropharynx is clear and moist.   Eyes: Conjunctivae are normal. Pupils are equal, round, and reactive to light. No scleral icterus.   Neck: Normal range of motion. Neck supple.   Cardiovascular: Normal rate, regular rhythm and normal heart sounds.  Exam reveals no gallop and no friction rub.    No murmur heard.  Pulmonary/Chest: Effort normal and breath sounds normal.   Abdominal:  Soft. Bowel sounds are normal.   Musculoskeletal: Normal range of motion. She exhibits no edema or tenderness.   Left flank pain, resolved    Neurological: She is alert and oriented to person, place, and time.   Skin: Skin is warm and dry.   Psychiatric: She has a normal mood and affect. Her behavior is normal.   Nursing note and vitals reviewed.      Significant Labs:   BMP:     Recent Labs  Lab 10/13/17  1727   *   *   K 3.9   CL 93*   CO2 22*   BUN 28*   CREATININE 1.3   CALCIUM 9.9     CBC:     Recent Labs  Lab 10/13/17  1727   WBC 14.81*   HGB 11.1*   HCT 33.7*        CMP:     Recent Labs  Lab 10/13/17  1727   *   K 3.9   CL 93*   CO2 22*   *   BUN 28*   CREATININE 1.3   CALCIUM 9.9   ANIONGAP 14   EGFRNONAA 39*     All pertinent labs within the past 24 hours have been reviewed.    Significant Imaging:   Imaging Results          CT Renal Stone Study Abd Pelvis WO (Final result)  Result time 10/13/17 21:22:33    Final result by Angelica Euceda MD (Timothy) (10/13/17 21:22:33)                 Impression:         There is marked bilateral hydronephrosis.  There is gas noted in the collecting systems bilaterally which could be related to gas forming infection.  A Ardon catheter in the bladder.    No acute bowel abnormalities.      All ct exams at this facility use dose modulation, iterative reconstruction, and/or weight based dosing to reduce radiation dose to as low as reasonably achievable.      Electronically signed by: ANGELICA EUCEDA MD  Date:     10/13/17  Time:    21:22              Narrative:    CT of the abdomen and pelvis without contrast    Clinical History:     Right flank pain    Findings:   Comparison 07/31/2011.    Lung bases are clear.  Small hiatal hernia.        The liver, the spleen, and the pancreas appear normal.  Prior cholecystectomy.    There is interval development of marked hydronephrosis involving the kidneys bilaterally.  There is gas noted in the  collecting systems bilaterally which is suspicious for hilar nephritis and infection.  No stones.  The bladder is contracted with a Ardon catheter in place.    There are no acute bowel abnormalities.  Normal appendix.      No abnormal masses or fluid collections in the pelvis.                            Assessment/Plan:      * Sepsis due to Gram negative bacteria    IV bolus - 30 ml/kg - given in the ED  WBC 14, mild mental status changes  Lactic acid normal - will repeat every 4 hours x 2  Blood cultures pending  Urine culture pending  IV Aztreonam and Flagyl   Pt. 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          CVA (cerebral vascular accident)    Remote hx of CVA  Continue ASA and Statin          Essential hypertension    Continue Coreg & amlodipine  Hydralazine 10 mg IV every 6 hours prn SBP > 170          Type 2 diabetes mellitus with other specified complication    Hold metformin  Sliding scale insulin  Diabetic diet          COPD (chronic obstructive pulmonary disease)    Supplemental oxygen to maintain sat > 92 %  DuoNebs prn            VTE Risk Mitigation         Ordered     heparin (porcine) injection 5,000 Units  Every 8 hours     Route:  Subcutaneous        10/13/17 2032              Tomasa Kevin NP  Department of Hospital Medicine   [facility]

## 2017-10-14 NOTE — PLAN OF CARE
Problem: Patient Care Overview  Goal: Plan of Care Review  Outcome: Ongoing (interventions implemented as appropriate)  Fall precautions maintained, pt free from injuries/fall, bed alarm on, repositions with encouragement, up with x1 assist. Ardon in place. IVF and abx given as prescribed. POCT monitored, coverage given as needed. POC and meds reviewed w/ pt and family, verbalizes understanding. Side rails x 2, bed locked and low. Chart check done. Will cont to monitor.

## 2017-10-14 NOTE — SUBJECTIVE & OBJECTIVE
Interval History: Patient reports flank pain has improved. Will continue current management plan.     Review of Systems   Constitutional: Positive for appetite change. Negative for activity change, chills, diaphoresis, fatigue and fever.   HENT: Negative.    Eyes: Negative for pain, redness and visual disturbance.   Respiratory: Negative for cough, shortness of breath and wheezing.    Cardiovascular: Negative for chest pain, palpitations and leg swelling.   Gastrointestinal: Positive for abdominal pain, nausea and vomiting. Negative for anal bleeding, blood in stool, constipation and diarrhea.   Genitourinary: Negative for dysuria, frequency and hematuria.        Incontinence requiring indwelling cath   Musculoskeletal: Positive for back pain. Negative for arthralgias and myalgias.   Skin: Negative for pallor, rash and wound.   Neurological: Negative for dizziness, weakness, light-headedness and headaches.   Psychiatric/Behavioral: Positive for confusion.     Objective:     Vital Signs (Most Recent):  Temp: 98.2 °F (36.8 °C) (10/14/17 1245)  Pulse: 78 (10/14/17 1307)  Resp: 17 (10/14/17 1307)  BP: (!) 156/72 (10/14/17 1245)  SpO2: 95 % (10/14/17 1307) Vital Signs (24h Range):  Temp:  [98.2 °F (36.8 °C)-98.8 °F (37.1 °C)] 98.2 °F (36.8 °C)  Pulse:  [73-95] 78  Resp:  [16-20] 17  SpO2:  [95 %-99 %] 95 %  BP: (115-165)/() 156/72     Weight: 51.6 kg (113 lb 12.1 oz)  Body mass index is 24.62 kg/m².    Intake/Output Summary (Last 24 hours) at 10/14/17 1542  Last data filed at 10/14/17 1535   Gross per 24 hour   Intake          3309.25 ml   Output             2325 ml   Net           984.25 ml      Physical Exam   Constitutional: She is oriented to person, place, and time. She appears well-developed and well-nourished.   HENT:   Head: Normocephalic and atraumatic.   Nose: Nose normal.   Mouth/Throat: Oropharynx is clear and moist.   Eyes: Conjunctivae are normal. Pupils are equal, round, and reactive to light. No  scleral icterus.   Neck: Normal range of motion. Neck supple.   Cardiovascular: Normal rate, regular rhythm and normal heart sounds.  Exam reveals no gallop and no friction rub.    No murmur heard.  Pulmonary/Chest: Effort normal and breath sounds normal.   Abdominal: Soft. Bowel sounds are normal.   Musculoskeletal: Normal range of motion. She exhibits no edema or tenderness.   Left flank pain, resolved    Neurological: She is alert and oriented to person, place, and time.   Skin: Skin is warm and dry.   Psychiatric: She has a normal mood and affect. Her behavior is normal.   Nursing note and vitals reviewed.      Significant Labs:   BMP:     Recent Labs  Lab 10/13/17  1727   *   *   K 3.9   CL 93*   CO2 22*   BUN 28*   CREATININE 1.3   CALCIUM 9.9     CBC:     Recent Labs  Lab 10/13/17  1727   WBC 14.81*   HGB 11.1*   HCT 33.7*        CMP:     Recent Labs  Lab 10/13/17  1727   *   K 3.9   CL 93*   CO2 22*   *   BUN 28*   CREATININE 1.3   CALCIUM 9.9   ANIONGAP 14   EGFRNONAA 39*     All pertinent labs within the past 24 hours have been reviewed.    Significant Imaging:   Imaging Results          CT Renal Stone Study Abd Pelvis WO (Final result)  Result time 10/13/17 21:22:33    Final result by Angelica Euceda MD (Timothy) (10/13/17 21:22:33)                 Impression:         There is marked bilateral hydronephrosis.  There is gas noted in the collecting systems bilaterally which could be related to gas forming infection.  A Ardon catheter in the bladder.    No acute bowel abnormalities.      All ct exams at this facility use dose modulation, iterative reconstruction, and/or weight based dosing to reduce radiation dose to as low as reasonably achievable.      Electronically signed by: ANGELICA EUCEDA MD  Date:     10/13/17  Time:    21:22              Narrative:    CT of the abdomen and pelvis without contrast    Clinical History:     Right flank pain    Findings:   Comparison  07/31/2011.    Lung bases are clear.  Small hiatal hernia.        The liver, the spleen, and the pancreas appear normal.  Prior cholecystectomy.    There is interval development of marked hydronephrosis involving the kidneys bilaterally.  There is gas noted in the collecting systems bilaterally which is suspicious for hilar nephritis and infection.  No stones.  The bladder is contracted with a Ardon catheter in place.    There are no acute bowel abnormalities.  Normal appendix.      No abnormal masses or fluid collections in the pelvis.

## 2017-10-14 NOTE — ASSESSMENT & PLAN NOTE
IV bolus - 30 ml/kg - given in the ED  WBC 14, mild mental status changes  Lactic acid normal - will repeat every 4 hours x 2  Blood cultures pending  Urine culture pending  IV Aztreonam

## 2017-10-14 NOTE — ASSESSMENT & PLAN NOTE
IV bolus - 30 ml/kg - given in the ED  WBC 14, mild mental status changes  Lactic acid normal - will repeat every 4 hours x 2  Blood cultures pending  Urine culture pending  IV Aztreonam and Flagyl   Pt. afebrile

## 2017-10-14 NOTE — ED NOTES
Patient resting in bed.  No distress noted.  Vitals stable.  Patient denies any further needs at this time.

## 2017-10-14 NOTE — HPI
Jessica Hamm is a 83 yo female with chronic indwelling cath, DM II, HTN, COPD, HPL, remote hx of CVA and hx of PE who was seen in the ED yesterday and found to have UTI and discharged with prescription for Cipro. Pt was called today with + blood cultures with gram negative bacteria. Yesterday, pt was noted to be disoriented and not her usual self. CT of Head was negative for acute findings. Today, the granddaughter states her mind is more clear. They deny fever, chills, chest pain, SOB, cough and palpitations. Pt describes right sided lumbar pain that radiates toward the pelvis - this is a new pain.  Labs note leukocytosis, normocytic anemia, hyponatremia & decreased GFR. U/A finds > 100 WBC. Pt is admitted for treatment of gram negative bacteremia with IV ABX - urine culture/blood cultures pending. CT ABD/pelvis pending.

## 2017-10-14 NOTE — H&P
"[facility] Hospital Medicine  History & Physical    Patient Name: Jessica Hamm  MRN: [id number]  Admission Date: 10/13/2017  Attending Physician:Christy Weiss MD  Primary Care Provider: Milo Gil MD         Patient information was obtained from patient, relative(s) and ER records.     Subjective:     Principal Problem:Sepsis due to Gram negative bacteria    Chief Complaint:   Chief Complaint   Patient presents with    Urinary Tract Infection     pt called to come back to ED for positive blood cultures and UTI        HPI: Jessica Hamm is a 83 yo female with chronic indwelling cath, DM II, HTN, COPD, HPL, remote hx of CVA and hx of PE who was seen in the ED yesterday and found to have UTI and discharged with prescription for Cipro. Pt was called today with + blood cultures with gram negative bacteria. Yesterday, pt was noted to be disoriented and not her usual self. CT of Head was negative for acute findings. Today, the granddaughter states her mind is more clear. They deny fever, chills, chest pain, SOB, cough and palpitations. Pt describes right sided lumbar pain that radiates toward the pelvis - this is a new pain.  Labs note leukocytosis, normocytic anemia, hyponatremia & decreased GFR. U/A finds > 100 WBC. Pt is admitted for treatment of gram negative bacteremia with IV ABX - urine culture/blood cultures pending. CT ABD/pelvis pending.     Past Medical History:   Diagnosis Date    Cancer 1974    Cervical    COPD (chronic obstructive pulmonary disease)     Diabetes mellitus     Hypertension     Stroke 1995       Past Surgical History:   Procedure Laterality Date    BACK SURGERY      carpal tunnel Bilateral     CHOLECYSTECTOMY      HYSTERECTOMY      Knee arthroscopy Bilateral        Review of patient's allergies indicates:   Allergen Reactions    Demerol [meperidine] Rash    Penicillins Hives and Rash     "EVEN THE ROOF OF MY MOUTH BROKE OUT"       No current " facility-administered medications on file prior to encounter.      Current Outpatient Prescriptions on File Prior to Encounter   Medication Sig    albuterol (PROVENTIL) 2.5 mg /3 mL (0.083 %) nebulizer solution Inhale 2.5 mg into the lungs.    amlodipine (NORVASC) 10 MG tablet Take 10 mg by mouth once daily.     aspirin (ECOTRIN) 81 MG EC tablet Take 81 mg by mouth once daily.     benzonatate (TESSALON) 200 MG capsule TAKE 1 CAPSULE (200 MG TOTAL) BY MOUTH 3 (THREE) TIMES DAILY.    carvedilol (COREG) 6.25 MG tablet Take 6.25 mg by mouth.    ciprofloxacin HCl (CIPRO) 250 MG tablet Take 1 tablet (250 mg total) by mouth 2 (two) times daily.    darifenacin (ENABLEX) 7.5 MG Tb24 Take 7.5 mg by mouth.    esomeprazole magnesium 22.3 mg CpDR Take 22.3 mg by mouth.    ferrous sulfate 324 mg (65 mg iron) TbEC Take 324 mg by mouth 2 (two) times daily.     fluticasone-vilanterol (BREO) 200-25 mcg/dose DsDv diskus inhaler Inhale 1 puff into the lungs.    hyoscyamine (LEVSIN/SL) 0.125 mg Subl TAKE 1 TABLET (0.125 MG TOTAL) BY MOUTH EVERY 4 (FOUR) HOURS AS NEEDED FOR CRAMPING (BLADDER SPASMS,    labetalol (NORMODYNE) 100 MG tablet     metformin (GLUCOPHAGE) 500 MG tablet     omeprazole (PRILOSEC) 40 MG capsule     pravastatin (PRAVACHOL) 10 MG tablet Take 10 mg by mouth once daily.     sertraline (ZOLOFT) 25 MG tablet Take 50 mg by mouth once daily.     cetirizine (ZYRTEC) 10 MG tablet Take 1 tablet (10 mg total) by mouth once daily.    nebulizer accessories Kit by Miscellaneous route. Use as directed    terconazole (TERAZOL 7) 0.4 % Crea Place 1 Applicatorful vaginally.    tobramycin-dexamethasone 0.3-0.1% (TOBRADEX) 0.3-0.1 % DrpS     warfarin (COUMADIN) 5 MG tablet 2.5 mg (5 mg x 0.5) on Tue, Thu; 5 mg (5 mg x 1) all other days    XARELTO 20 mg Tab      Family History     None        Social History Main Topics    Smoking status: Never Smoker    Smokeless tobacco: Never Used    Alcohol use No    Drug  use: No    Sexual activity: Not on file     Review of Systems   Constitutional: Positive for appetite change. Negative for activity change, chills, diaphoresis, fatigue and fever.   HENT: Negative.    Eyes: Negative for pain, redness and visual disturbance.   Respiratory: Negative for cough, shortness of breath and wheezing.    Cardiovascular: Negative for chest pain, palpitations and leg swelling.   Gastrointestinal: Positive for abdominal pain, nausea and vomiting. Negative for anal bleeding, blood in stool, constipation and diarrhea.   Genitourinary: Negative for dysuria, frequency and hematuria.        Incontinence requiring indwelling cath   Musculoskeletal: Positive for back pain. Negative for arthralgias and myalgias.   Skin: Negative for pallor, rash and wound.   Neurological: Negative for dizziness, weakness, light-headedness and headaches.   Psychiatric/Behavioral: Positive for confusion.     Objective:     Vital Signs (Most Recent):  Temp: 98.8 °F (37.1 °C) (10/13/17 1752)  Pulse: 95 (10/13/17 1752)  Resp: 18 (10/13/17 1752)  BP: (!) 158/73 (10/13/17 1752)  SpO2: 96 % (10/13/17 1752) Vital Signs (24h Range):  Temp:  [98.2 °F (36.8 °C)-98.8 °F (37.1 °C)] 98.8 °F (37.1 °C)  Pulse:  [82-95] 95  Resp:  [18] 18  SpO2:  [96 %-97 %] 96 %  BP: (150-158)/(73-80) 158/73     Weight: 48.1 kg (106 lb)  Body mass index is 23.76 kg/m².    Physical Exam   Constitutional: She is oriented to person, place, and time. She appears well-developed and well-nourished.   HENT:   Head: Normocephalic and atraumatic.   Nose: Nose normal.   Mouth/Throat: Oropharynx is clear and moist.   Eyes: Conjunctivae are normal. Pupils are equal, round, and reactive to light. No scleral icterus.   Neck: Normal range of motion. Neck supple.   Cardiovascular: Normal rate, regular rhythm and normal heart sounds.  Exam reveals no gallop and no friction rub.    No murmur heard.  Pulmonary/Chest: Effort normal and breath sounds normal.   Abdominal:  Soft. Bowel sounds are normal.   Musculoskeletal: Normal range of motion. She exhibits no edema or tenderness.        Back:    Neurological: She is alert and oriented to person, place, and time.   Skin: Skin is warm and dry.   Psychiatric: She has a normal mood and affect. Her behavior is normal.   Nursing note and vitals reviewed.       Significant Labs:   Results for orders placed or performed during the hospital encounter of 10/13/17   CBC auto differential   Result Value Ref Range    WBC 14.81 (H) 3.90 - 12.70 K/uL    RBC 4.10 4.00 - 5.40 M/uL    Hemoglobin 11.1 (L) 12.0 - 16.0 g/dL    Hematocrit 33.7 (L) 37.0 - 48.5 %    MCV 82 82 - 98 fL    MCH 27.1 27.0 - 31.0 pg    MCHC 32.9 32.0 - 36.0 g/dL    RDW 15.1 (H) 11.5 - 14.5 %    Platelets 333 150 - 350 K/uL    MPV 9.6 9.2 - 12.9 fL    Gran # 10.7 (H) 1.8 - 7.7 K/uL    Lymph # 1.9 1.0 - 4.8 K/uL    Mono # 1.9 (H) 0.3 - 1.0 K/uL    Eos # 0.2 0.0 - 0.5 K/uL    Baso # 0.08 0.00 - 0.20 K/uL    Gran% 72.4 38.0 - 73.0 %    Lymph% 13.1 (L) 18.0 - 48.0 %    Mono% 12.8 4.0 - 15.0 %    Eosinophil% 1.2 0.0 - 8.0 %    Basophil% 0.5 0.0 - 1.9 %    Differential Method Automated    Basic metabolic panel   Result Value Ref Range    Sodium 129 (L) 136 - 145 mmol/L    Potassium 3.9 3.5 - 5.1 mmol/L    Chloride 93 (L) 95 - 110 mmol/L    CO2 22 (L) 23 - 29 mmol/L    Glucose 113 (H) 70 - 110 mg/dL    BUN, Bld 28 (H) 8 - 23 mg/dL    Creatinine 1.3 0.5 - 1.4 mg/dL    Calcium 9.9 8.7 - 10.5 mg/dL    Anion Gap 14 8 - 16 mmol/L    eGFR if African American 44 (A) >60 mL/min/1.73 m^2    eGFR if non African American 39 (A) >60 mL/min/1.73 m^2   Lactic acid, plasma   Result Value Ref Range    Lactate (Lactic Acid) 1.3 0.5 - 2.2 mmol/L   Urinalysis   Result Value Ref Range    Specimen UA Urine, Catheterized     Color, UA Yellow Yellow, Straw, Jesi    Appearance, UA Cloudy (A) Clear    pH, UA 7.0 5.0 - 8.0    Specific Gravity, UA <=1.005 (A) 1.005 - 1.030    Protein, UA 1+ (A) Negative     Glucose, UA Negative Negative    Ketones, UA Negative Negative    Bilirubin (UA) Negative Negative    Occult Blood UA 3+ (A) Negative    Nitrite, UA Negative Negative    Urobilinogen, UA Negative <2.0 EU/dL    Leukocytes, UA 3+ (A) Negative   Urinalysis Microscopic   Result Value Ref Range    RBC, UA 20 (H) 0 - 4 /hpf    WBC, UA >100 (H) 0 - 5 /hpf    Bacteria, UA Moderate (A) None-Occ /hpf    Yeast, UA Many (A) None    Hyaline Casts, UA 0 0-1/lpf /lpf    Microscopic Comment SEE COMMENT        Significant Imaging:    Imaging Results    None       Assessment/Plan:     * Sepsis due to Gram negative bacteria    IV bolus - 30 ml/kg - given in the ED  WBC 14, mild mental status changes  Lactic acid normal - will repeat every 4 hours x 2  Blood cultures pending  Urine culture pending  IV Aztreonam          UTI (urinary tract infection)    Pt has chronic indwelling cath due to neurogenic bladder  R/O pyelonephritis - arroyo cath in place and producing urine  There is interval development of marked hydronephrosis involving the kidneys bilaterally.  There is gas noted in the collecting systems bilaterally which is suspicious for hilar nephritis and infection.  Urine culture pending  IV aztreonam and IV Flagyl  Date of catheter change - ?          Gram-negative bacteremia    Likely due to UTI  ID and sensitivities pending  Aztreonam IV  Urine culture pending          CVA (cerebral vascular accident)    Remote hx of CVA  Continue ASA and Statin          Essential hypertension    Continue Coreg & amlodipine  Hydralazine 10 mg IV every 6 hours prn SBP > 170          Type 2 diabetes mellitus with other specified complication    Hold metformin  Sliding scale insulin  Diabetic diet          COPD (chronic obstructive pulmonary disease)    Supplemental oxygen to maintain sat > 92 %  DuoNebs prn            VTE Risk Mitigation     None             Zenobia Lemons NP  Department of Hospital Medicine   [facility]

## 2017-10-14 NOTE — SUBJECTIVE & OBJECTIVE
"Past Medical History:   Diagnosis Date    Cancer 1974    Cervical    COPD (chronic obstructive pulmonary disease)     Diabetes mellitus     Hypertension     Stroke 1995       Past Surgical History:   Procedure Laterality Date    BACK SURGERY      carpal tunnel Bilateral     CHOLECYSTECTOMY      HYSTERECTOMY      Knee arthroscopy Bilateral        Review of patient's allergies indicates:   Allergen Reactions    Demerol [meperidine] Rash    Penicillins Hives and Rash     "EVEN THE ROOF OF MY MOUTH BROKE OUT"       No current facility-administered medications on file prior to encounter.      Current Outpatient Prescriptions on File Prior to Encounter   Medication Sig    albuterol (PROVENTIL) 2.5 mg /3 mL (0.083 %) nebulizer solution Inhale 2.5 mg into the lungs.    amlodipine (NORVASC) 10 MG tablet Take 10 mg by mouth once daily.     aspirin (ECOTRIN) 81 MG EC tablet Take 81 mg by mouth once daily.     benzonatate (TESSALON) 200 MG capsule TAKE 1 CAPSULE (200 MG TOTAL) BY MOUTH 3 (THREE) TIMES DAILY.    carvedilol (COREG) 6.25 MG tablet Take 6.25 mg by mouth.    ciprofloxacin HCl (CIPRO) 250 MG tablet Take 1 tablet (250 mg total) by mouth 2 (two) times daily.    darifenacin (ENABLEX) 7.5 MG Tb24 Take 7.5 mg by mouth.    esomeprazole magnesium 22.3 mg CpDR Take 22.3 mg by mouth.    ferrous sulfate 324 mg (65 mg iron) TbEC Take 324 mg by mouth 2 (two) times daily.     fluticasone-vilanterol (BREO) 200-25 mcg/dose DsDv diskus inhaler Inhale 1 puff into the lungs.    hyoscyamine (LEVSIN/SL) 0.125 mg Subl TAKE 1 TABLET (0.125 MG TOTAL) BY MOUTH EVERY 4 (FOUR) HOURS AS NEEDED FOR CRAMPING (BLADDER SPASMS,    labetalol (NORMODYNE) 100 MG tablet     metformin (GLUCOPHAGE) 500 MG tablet     omeprazole (PRILOSEC) 40 MG capsule     pravastatin (PRAVACHOL) 10 MG tablet Take 10 mg by mouth once daily.     sertraline (ZOLOFT) 25 MG tablet Take 50 mg by mouth once daily.     cetirizine (ZYRTEC) 10 MG " tablet Take 1 tablet (10 mg total) by mouth once daily.    nebulizer accessories Kit by Miscellaneous route. Use as directed    terconazole (TERAZOL 7) 0.4 % Crea Place 1 Applicatorful vaginally.    tobramycin-dexamethasone 0.3-0.1% (TOBRADEX) 0.3-0.1 % DrpS     warfarin (COUMADIN) 5 MG tablet 2.5 mg (5 mg x 0.5) on Tue, Thu; 5 mg (5 mg x 1) all other days    XARELTO 20 mg Tab      Family History     None        Social History Main Topics    Smoking status: Never Smoker    Smokeless tobacco: Never Used    Alcohol use No    Drug use: No    Sexual activity: Not on file     Review of Systems   Constitutional: Positive for appetite change. Negative for activity change, chills, diaphoresis, fatigue and fever.   HENT: Negative.    Eyes: Negative for pain, redness and visual disturbance.   Respiratory: Negative for cough, shortness of breath and wheezing.    Cardiovascular: Negative for chest pain, palpitations and leg swelling.   Gastrointestinal: Positive for abdominal pain, nausea and vomiting. Negative for anal bleeding, blood in stool, constipation and diarrhea.   Genitourinary: Negative for dysuria, frequency and hematuria.        Incontinence requiring indwelling cath   Musculoskeletal: Positive for back pain. Negative for arthralgias and myalgias.   Skin: Negative for pallor, rash and wound.   Neurological: Negative for dizziness, weakness, light-headedness and headaches.   Psychiatric/Behavioral: Positive for confusion.     Objective:     Vital Signs (Most Recent):  Temp: 98.8 °F (37.1 °C) (10/13/17 1752)  Pulse: 95 (10/13/17 1752)  Resp: 18 (10/13/17 1752)  BP: (!) 158/73 (10/13/17 1752)  SpO2: 96 % (10/13/17 1752) Vital Signs (24h Range):  Temp:  [98.2 °F (36.8 °C)-98.8 °F (37.1 °C)] 98.8 °F (37.1 °C)  Pulse:  [82-95] 95  Resp:  [18] 18  SpO2:  [96 %-97 %] 96 %  BP: (150-158)/(73-80) 158/73     Weight: 48.1 kg (106 lb)  Body mass index is 23.76 kg/m².    Physical Exam   Constitutional: She is oriented  to person, place, and time. She appears well-developed and well-nourished.   HENT:   Head: Normocephalic and atraumatic.   Nose: Nose normal.   Mouth/Throat: Oropharynx is clear and moist.   Eyes: Conjunctivae are normal. Pupils are equal, round, and reactive to light. No scleral icterus.   Neck: Normal range of motion. Neck supple.   Cardiovascular: Normal rate, regular rhythm and normal heart sounds.  Exam reveals no gallop and no friction rub.    No murmur heard.  Pulmonary/Chest: Effort normal and breath sounds normal.   Abdominal: Soft. Bowel sounds are normal.   Musculoskeletal: Normal range of motion. She exhibits no edema or tenderness.        Back:    Neurological: She is alert and oriented to person, place, and time.   Skin: Skin is warm and dry.   Psychiatric: She has a normal mood and affect. Her behavior is normal.   Nursing note and vitals reviewed.       Significant Labs:   Results for orders placed or performed during the hospital encounter of 10/13/17   CBC auto differential   Result Value Ref Range    WBC 14.81 (H) 3.90 - 12.70 K/uL    RBC 4.10 4.00 - 5.40 M/uL    Hemoglobin 11.1 (L) 12.0 - 16.0 g/dL    Hematocrit 33.7 (L) 37.0 - 48.5 %    MCV 82 82 - 98 fL    MCH 27.1 27.0 - 31.0 pg    MCHC 32.9 32.0 - 36.0 g/dL    RDW 15.1 (H) 11.5 - 14.5 %    Platelets 333 150 - 350 K/uL    MPV 9.6 9.2 - 12.9 fL    Gran # 10.7 (H) 1.8 - 7.7 K/uL    Lymph # 1.9 1.0 - 4.8 K/uL    Mono # 1.9 (H) 0.3 - 1.0 K/uL    Eos # 0.2 0.0 - 0.5 K/uL    Baso # 0.08 0.00 - 0.20 K/uL    Gran% 72.4 38.0 - 73.0 %    Lymph% 13.1 (L) 18.0 - 48.0 %    Mono% 12.8 4.0 - 15.0 %    Eosinophil% 1.2 0.0 - 8.0 %    Basophil% 0.5 0.0 - 1.9 %    Differential Method Automated    Basic metabolic panel   Result Value Ref Range    Sodium 129 (L) 136 - 145 mmol/L    Potassium 3.9 3.5 - 5.1 mmol/L    Chloride 93 (L) 95 - 110 mmol/L    CO2 22 (L) 23 - 29 mmol/L    Glucose 113 (H) 70 - 110 mg/dL    BUN, Bld 28 (H) 8 - 23 mg/dL    Creatinine 1.3 0.5 -  1.4 mg/dL    Calcium 9.9 8.7 - 10.5 mg/dL    Anion Gap 14 8 - 16 mmol/L    eGFR if African American 44 (A) >60 mL/min/1.73 m^2    eGFR if non African American 39 (A) >60 mL/min/1.73 m^2   Lactic acid, plasma   Result Value Ref Range    Lactate (Lactic Acid) 1.3 0.5 - 2.2 mmol/L   Urinalysis   Result Value Ref Range    Specimen UA Urine, Catheterized     Color, UA Yellow Yellow, Straw, Jesi    Appearance, UA Cloudy (A) Clear    pH, UA 7.0 5.0 - 8.0    Specific Gravity, UA <=1.005 (A) 1.005 - 1.030    Protein, UA 1+ (A) Negative    Glucose, UA Negative Negative    Ketones, UA Negative Negative    Bilirubin (UA) Negative Negative    Occult Blood UA 3+ (A) Negative    Nitrite, UA Negative Negative    Urobilinogen, UA Negative <2.0 EU/dL    Leukocytes, UA 3+ (A) Negative   Urinalysis Microscopic   Result Value Ref Range    RBC, UA 20 (H) 0 - 4 /hpf    WBC, UA >100 (H) 0 - 5 /hpf    Bacteria, UA Moderate (A) None-Occ /hpf    Yeast, UA Many (A) None    Hyaline Casts, UA 0 0-1/lpf /lpf    Microscopic Comment SEE COMMENT        Significant Imaging:    Imaging Results    None

## 2017-10-14 NOTE — HOSPITAL COURSE
82 year old female admitted for Sepsis due to Gram negative bacteria. In ED, labs revealed leukocytosis, normocytic anemia, hyponatremia & decreased GFR. U/A finds > 100 WBC. Urine culture/blood cultures. Sepsis protocol activated in the ED. IV abx started. CT ABD/pelvis revealed bilateral hydronephrosis.There is gas noted in the collecting systems bilaterally which could be related to gas forming infection. Patient remains afebrile. WBC's normal. Repeat Blood cultures show NGTD. Urine culture shows yeast >100,000. Awaiting sensitivities. Will continue current IV abx management. VSS. Potassium 3.1, will replace accordingly. Sensitivities back will discharge on Bactrim DS po x 10 days. Symptoms improving, pt. Back to baseline. Case discussed with Dr. Landin. Home medications reconciled. New prescriptions given. Patient to follow-up with PCP in 3-5 days for hospital follow-up and repeat labs. Patient also to follow-up with urology in 1 week for hospital follow-up. Patient seen and examined on the date of discharge and found suitable.

## 2017-10-14 NOTE — ED NOTES
Patient given juice and crackers per request, offered a full meal but she declined. Patient wedged to left side with pillows.

## 2017-10-14 NOTE — ED NOTES
Patient moved to hospital bed and positioned for comfort.  Family member now resting at bedside in ED stretcher.  Patient stable.  Pitcher of ice water placed at bedside.  No distress noted and no further needs voiced at this time.  Bed remains low and locked with side rails up x 2 and call light within reach.  Awaiting placement to hospital floor for admit.

## 2017-10-15 PROBLEM — N39.0 UTI (URINARY TRACT INFECTION): Status: ACTIVE | Noted: 2017-10-15

## 2017-10-15 PROBLEM — T83.511A URINARY TRACT INFECTION ASSOCIATED WITH INDWELLING URETHRAL CATHETER: Status: ACTIVE | Noted: 2017-10-15

## 2017-10-15 LAB
ANION GAP SERPL CALC-SCNC: 10 MMOL/L
BACTERIA BLD CULT: NORMAL
BACTERIA UR CULT: NORMAL
BACTERIA UR CULT: NORMAL
BASOPHILS # BLD AUTO: 0.11 K/UL
BASOPHILS # BLD AUTO: 0.11 K/UL
BASOPHILS NFR BLD: 1 %
BASOPHILS NFR BLD: 1 %
BUN SERPL-MCNC: 11 MG/DL
CALCIUM SERPL-MCNC: 9.3 MG/DL
CHLORIDE SERPL-SCNC: 107 MMOL/L
CO2 SERPL-SCNC: 21 MMOL/L
CREAT SERPL-MCNC: 0.8 MG/DL
DIFFERENTIAL METHOD: ABNORMAL
DIFFERENTIAL METHOD: ABNORMAL
EOSINOPHIL # BLD AUTO: 0.2 K/UL
EOSINOPHIL # BLD AUTO: 0.2 K/UL
EOSINOPHIL NFR BLD: 1.4 %
EOSINOPHIL NFR BLD: 1.4 %
ERYTHROCYTE [DISTWIDTH] IN BLOOD BY AUTOMATED COUNT: 15.2 %
ERYTHROCYTE [DISTWIDTH] IN BLOOD BY AUTOMATED COUNT: 15.2 %
EST. GFR  (AFRICAN AMERICAN): >60 ML/MIN/1.73 M^2
EST. GFR  (NON AFRICAN AMERICAN): >60 ML/MIN/1.73 M^2
GLUCOSE SERPL-MCNC: 133 MG/DL
HCT VFR BLD AUTO: 32.5 %
HCT VFR BLD AUTO: 32.5 %
HGB BLD-MCNC: 10.5 G/DL
HGB BLD-MCNC: 10.5 G/DL
LYMPHOCYTES # BLD AUTO: 1.9 K/UL
LYMPHOCYTES # BLD AUTO: 1.9 K/UL
LYMPHOCYTES NFR BLD: 17.3 %
LYMPHOCYTES NFR BLD: 17.3 %
MCH RBC QN AUTO: 26.7 PG
MCH RBC QN AUTO: 26.7 PG
MCHC RBC AUTO-ENTMCNC: 32.3 G/DL
MCHC RBC AUTO-ENTMCNC: 32.3 G/DL
MCV RBC AUTO: 83 FL
MCV RBC AUTO: 83 FL
MONOCYTES # BLD AUTO: 1.1 K/UL
MONOCYTES # BLD AUTO: 1.1 K/UL
MONOCYTES NFR BLD: 10.3 %
MONOCYTES NFR BLD: 10.3 %
NEUTROPHILS # BLD AUTO: 7.7 K/UL
NEUTROPHILS # BLD AUTO: 7.7 K/UL
NEUTROPHILS NFR BLD: 70 %
NEUTROPHILS NFR BLD: 70 %
PLATELET # BLD AUTO: 377 K/UL
PLATELET # BLD AUTO: 377 K/UL
PMV BLD AUTO: 9.5 FL
PMV BLD AUTO: 9.5 FL
POCT GLUCOSE: 138 MG/DL (ref 70–110)
POCT GLUCOSE: 177 MG/DL (ref 70–110)
POCT GLUCOSE: 194 MG/DL (ref 70–110)
POTASSIUM SERPL-SCNC: 3.5 MMOL/L
RBC # BLD AUTO: 3.93 M/UL
RBC # BLD AUTO: 3.93 M/UL
SODIUM SERPL-SCNC: 138 MMOL/L
WBC # BLD AUTO: 10.99 K/UL
WBC # BLD AUTO: 10.99 K/UL

## 2017-10-15 PROCEDURE — 25000242 PHARM REV CODE 250 ALT 637 W/ HCPCS: Performed by: NURSE PRACTITIONER

## 2017-10-15 PROCEDURE — 25000003 PHARM REV CODE 250: Performed by: NURSE PRACTITIONER

## 2017-10-15 PROCEDURE — 94640 AIRWAY INHALATION TREATMENT: CPT

## 2017-10-15 PROCEDURE — 63600175 PHARM REV CODE 636 W HCPCS: Performed by: NURSE PRACTITIONER

## 2017-10-15 PROCEDURE — 36415 COLL VENOUS BLD VENIPUNCTURE: CPT

## 2017-10-15 PROCEDURE — 80048 BASIC METABOLIC PNL TOTAL CA: CPT

## 2017-10-15 PROCEDURE — 85025 COMPLETE CBC W/AUTO DIFF WBC: CPT

## 2017-10-15 PROCEDURE — 96372 THER/PROPH/DIAG INJ SC/IM: CPT

## 2017-10-15 PROCEDURE — 25000003 PHARM REV CODE 250: Performed by: EMERGENCY MEDICINE

## 2017-10-15 PROCEDURE — S0030 INJECTION, METRONIDAZOLE: HCPCS | Performed by: NURSE PRACTITIONER

## 2017-10-15 PROCEDURE — S0073 INJECTION, AZTREONAM, 500 MG: HCPCS | Performed by: EMERGENCY MEDICINE

## 2017-10-15 PROCEDURE — 63600175 PHARM REV CODE 636 W HCPCS: Performed by: EMERGENCY MEDICINE

## 2017-10-15 PROCEDURE — 11000001 HC ACUTE MED/SURG PRIVATE ROOM

## 2017-10-15 RX ORDER — RAMELTEON 8 MG/1
8 TABLET ORAL NIGHTLY PRN
Status: DISCONTINUED | OUTPATIENT
Start: 2017-10-15 | End: 2017-10-16 | Stop reason: HOSPADM

## 2017-10-15 RX ORDER — ALPRAZOLAM 0.5 MG/1
0.5 TABLET ORAL 2 TIMES DAILY PRN
Status: DISCONTINUED | OUTPATIENT
Start: 2017-10-15 | End: 2017-10-16 | Stop reason: HOSPADM

## 2017-10-15 RX ORDER — HYDRALAZINE HYDROCHLORIDE 20 MG/ML
10 INJECTION INTRAMUSCULAR; INTRAVENOUS EVERY 6 HOURS PRN
Status: DISCONTINUED | OUTPATIENT
Start: 2017-10-15 | End: 2017-10-16 | Stop reason: HOSPADM

## 2017-10-15 RX ADMIN — RAMELTEON 8 MG: 8 TABLET, FILM COATED ORAL at 12:10

## 2017-10-15 RX ADMIN — HYDRALAZINE HYDROCHLORIDE 10 MG: 20 INJECTION INTRAMUSCULAR; INTRAVENOUS at 12:10

## 2017-10-15 RX ADMIN — HEPARIN SODIUM 5000 UNITS: 5000 INJECTION, SOLUTION INTRAVENOUS; SUBCUTANEOUS at 09:10

## 2017-10-15 RX ADMIN — AZTREONAM 500 MG: 1 INJECTION, POWDER, LYOPHILIZED, FOR SOLUTION INTRAMUSCULAR; INTRAVENOUS at 09:10

## 2017-10-15 RX ADMIN — METRONIDAZOLE 500 MG: 500 INJECTION, SOLUTION INTRAVENOUS at 04:10

## 2017-10-15 RX ADMIN — METRONIDAZOLE 500 MG: 500 INJECTION, SOLUTION INTRAVENOUS at 12:10

## 2017-10-15 RX ADMIN — HYOSCYAMINE SULFATE 0.12 MG: 0.12 TABLET ORAL at 12:10

## 2017-10-15 RX ADMIN — CARVEDILOL 6.25 MG: 3.12 TABLET, FILM COATED ORAL at 10:10

## 2017-10-15 RX ADMIN — PRAVASTATIN SODIUM 10 MG: 10 TABLET ORAL at 10:10

## 2017-10-15 RX ADMIN — ASPIRIN 81 MG: 81 TABLET, COATED ORAL at 10:10

## 2017-10-15 RX ADMIN — BUDESONIDE 0.5 MG: 0.5 SUSPENSION RESPIRATORY (INHALATION) at 07:10

## 2017-10-15 RX ADMIN — HEPARIN SODIUM 5000 UNITS: 5000 INJECTION, SOLUTION INTRAVENOUS; SUBCUTANEOUS at 05:10

## 2017-10-15 RX ADMIN — ARFORMOTEROL TARTRATE 15 MCG: 15 SOLUTION RESPIRATORY (INHALATION) at 07:10

## 2017-10-15 RX ADMIN — METRONIDAZOLE 500 MG: 500 INJECTION, SOLUTION INTRAVENOUS at 08:10

## 2017-10-15 RX ADMIN — HEPARIN SODIUM 5000 UNITS: 5000 INJECTION, SOLUTION INTRAVENOUS; SUBCUTANEOUS at 03:10

## 2017-10-15 RX ADMIN — AZTREONAM 500 MG: 1 INJECTION, POWDER, LYOPHILIZED, FOR SOLUTION INTRAMUSCULAR; INTRAVENOUS at 10:10

## 2017-10-15 RX ADMIN — PANTOPRAZOLE SODIUM 40 MG: 40 TABLET, DELAYED RELEASE ORAL at 10:10

## 2017-10-15 RX ADMIN — FERROUS SULFATE TAB EC 325 MG (65 MG FE EQUIVALENT) 325 MG: 325 (65 FE) TABLET DELAYED RESPONSE at 08:10

## 2017-10-15 RX ADMIN — AMLODIPINE BESYLATE 10 MG: 10 TABLET ORAL at 10:10

## 2017-10-15 RX ADMIN — SERTRALINE HYDROCHLORIDE 50 MG: 50 TABLET ORAL at 10:10

## 2017-10-15 RX ADMIN — SODIUM CHLORIDE: 0.9 INJECTION, SOLUTION INTRAVENOUS at 09:10

## 2017-10-15 RX ADMIN — FERROUS SULFATE TAB EC 325 MG (65 MG FE EQUIVALENT) 325 MG: 325 (65 FE) TABLET DELAYED RESPONSE at 10:10

## 2017-10-15 RX ADMIN — CARVEDILOL 6.25 MG: 3.12 TABLET, FILM COATED ORAL at 04:10

## 2017-10-15 NOTE — PROGRESS NOTES
[facility] Hospital Medicine  Progress Note    Patient Name: Jessica Hamm  MRN: [id number]  Patient Class: IP- Inpatient   Admission Date: 10/13/2017  Length of Stay: 2 days  Attending Physician: Christy Weiss MD  Primary Care Provider: Milo Gil MD        Subjective:     Principal Problem:Sepsis due to Gram negative bacteria    HPI:  Jessica Hamm is a 83 yo female with chronic indwelling cath, DM II, HTN, COPD, HPL, remote hx of CVA and hx of PE who was seen in the ED yesterday and found to have UTI and discharged with prescription for Cipro. Pt was called today with + blood cultures with gram negative bacteria. Yesterday, pt was noted to be disoriented and not her usual self. CT of Head was negative for acute findings. Today, the granddaughter states her mind is more clear. They deny fever, chills, chest pain, SOB, cough and palpitations. Pt describes right sided lumbar pain that radiates toward the pelvis - this is a new pain.  Labs note leukocytosis, normocytic anemia, hyponatremia & decreased GFR. U/A finds > 100 WBC. Pt is admitted for treatment of gram negative bacteremia with IV ABX - urine culture/blood cultures pending. CT ABD/pelvis pending.     Hospital Course:  82 year old female admitted for Sepsis due to Gram negative bacteria. In ED, labs revealed leukocytosis, normocytic anemia, hyponatremia & decreased GFR. U/A finds > 100 WBC. Urine culture/blood cultures. Sepsis protocol activated in the ED. IV abx started. CT ABD/pelvis revealed bilateral hydronephrosis.There is gas noted in the collecting systems bilaterally which could be related to gas forming infection. Patient remains afebrile. WBC's normal. Repeat Blood cultures show NGTD. Urine culture shows yeast >100,000. Awaiting sensitively. Will continue current IV abx management. VSS.    Interval History: Patient lying in bed talking with family. Family reports pt. Is not back to baseline. Patient still continues to  be confused at times. Will continue to monitor.     Review of Systems   Constitutional: Positive for appetite change. Negative for activity change, chills, diaphoresis, fatigue and fever.   HENT: Negative.    Eyes: Negative for pain, redness and visual disturbance.   Respiratory: Negative for cough, shortness of breath and wheezing.    Cardiovascular: Negative for chest pain, palpitations and leg swelling.   Gastrointestinal: Positive for abdominal pain, nausea and vomiting. Negative for anal bleeding, blood in stool, constipation and diarrhea.   Genitourinary: Negative for dysuria, frequency and hematuria.        Incontinence requiring indwelling cath   Musculoskeletal: Positive for back pain. Negative for arthralgias and myalgias.   Skin: Negative for pallor, rash and wound.   Neurological: Negative for dizziness, weakness, light-headedness and headaches.   Psychiatric/Behavioral: Positive for confusion.     Objective:     Vital Signs (Most Recent):  Temp: 99 °F (37.2 °C) (10/15/17 0805)  Pulse: 90 (10/15/17 0805)  Resp: 18 (10/15/17 0805)  BP: 139/70 (10/15/17 0805)  SpO2: 95 % (10/15/17 0805) Vital Signs (24h Range):  Temp:  [98.2 °F (36.8 °C)-99 °F (37.2 °C)] 99 °F (37.2 °C)  Pulse:  [] 90  Resp:  [17-20] 18  SpO2:  [93 %-96 %] 95 %  BP: (139-186)/(56-84) 139/70     Weight: 51.6 kg (113 lb 12.1 oz)  Body mass index is 24.62 kg/m².    Intake/Output Summary (Last 24 hours) at 10/15/17 1135  Last data filed at 10/15/17 0800   Gross per 24 hour   Intake          2396.25 ml   Output             2525 ml   Net          -128.75 ml      Physical Exam   Constitutional: She appears well-developed and well-nourished.   HENT:   Head: Normocephalic and atraumatic.   Nose: Nose normal.   Mouth/Throat: Oropharynx is clear and moist.   Eyes: Conjunctivae are normal. Pupils are equal, round, and reactive to light. No scleral icterus.   Neck: Normal range of motion. Neck supple.   Cardiovascular: Normal rate, regular rhythm  and normal heart sounds.  Exam reveals no gallop and no friction rub.    No murmur heard.  Pulmonary/Chest: Effort normal and breath sounds normal.   Abdominal: Soft. Bowel sounds are normal.   Musculoskeletal: Normal range of motion. She exhibits no edema or tenderness.   Left flank pain, resolved    Neurological: She is alert. She is disoriented (time ).   Confused at times    Skin: Skin is warm and dry.   Psychiatric: She has a normal mood and affect. Her behavior is normal.   Nursing note and vitals reviewed.      Significant Labs:   Blood Culture:   Recent Labs  Lab 10/13/17  1725 10/13/17  1736   LABBLOO No Growth to date  No Growth to date No Growth to date  No Growth to date     BMP:   Recent Labs  Lab 10/15/17  0511   *      K 3.5      CO2 21*   BUN 11   CREATININE 0.8   CALCIUM 9.3     CBC:   Recent Labs  Lab 10/13/17  1727 10/14/17  1637 10/15/17  0511   WBC 14.81* 8.34 10.99  10.99   HGB 11.1* 9.8* 10.5*  10.5*   HCT 33.7* 30.4* 32.5*  32.5*    309 377*  377*     CMP:   Recent Labs  Lab 10/13/17  1727 10/15/17  0511   * 138   K 3.9 3.5   CL 93* 107   CO2 22* 21*   * 133*   BUN 28* 11   CREATININE 1.3 0.8   CALCIUM 9.9 9.3   ANIONGAP 14 10   EGFRNONAA 39* >60     Urine Culture:   Recent Labs  Lab 10/13/17  1830   LABURIN YEAST > 100,000 cfu/mlIdentification pending     Urine Studies:   Recent Labs  Lab 10/13/17  1831   COLORU Yellow   APPEARANCEUA Cloudy*   PHUR 7.0   SPECGRAV <=1.005*   PROTEINUA 1+*   GLUCUA Negative   KETONESU Negative   BILIRUBINUA Negative   OCCULTUA 3+*   NITRITE Negative   UROBILINOGEN Negative   LEUKOCYTESUR 3+*   RBCUA 20*   WBCUA >100*   BACTERIA Moderate*   HYALINECASTS 0     All pertinent labs within the past 24 hours have been reviewed.    Significant Imaging:   Imaging Results          CT Renal Stone Study Abd Pelvis WO (Final result)  Result time 10/13/17 21:22:33    Final result by Angelica Wall MD (Timothy) (10/13/17  21:22:33)                 Impression:         There is marked bilateral hydronephrosis.  There is gas noted in the collecting systems bilaterally which could be related to gas forming infection.  A Ardon catheter in the bladder.    No acute bowel abnormalities.      All ct exams at this facility use dose modulation, iterative reconstruction, and/or weight based dosing to reduce radiation dose to as low as reasonably achievable.      Electronically signed by: JOY EUCEDA MD  Date:     10/13/17  Time:    21:22              Narrative:    CT of the abdomen and pelvis without contrast    Clinical History:     Right flank pain    Findings:   Comparison 07/31/2011.    Lung bases are clear.  Small hiatal hernia.        The liver, the spleen, and the pancreas appear normal.  Prior cholecystectomy.    There is interval development of marked hydronephrosis involving the kidneys bilaterally.  There is gas noted in the collecting systems bilaterally which is suspicious for hilar nephritis and infection.  No stones.  The bladder is contracted with a Ardon catheter in place.    There are no acute bowel abnormalities.  Normal appendix.      No abnormal masses or fluid collections in the pelvis.                            Assessment/Plan:      * Sepsis due to Gram negative bacteria    IV bolus - 30 ml/kg - given in the ED  WBC 14, mild mental status changes  Lactic acid normal - will repeat every 4 hours x 2  Repeat Blood cultures show NGTD  Urine culture shows yeast >100,000  IV Aztreonam and Flagyl   Pt. 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  Urine culture   IV aztreonam and Flagyl started  Catheter changed on 10/14/17  Urine culture reveals yeast >100,000  Awaiting sensitivity           CVA (cerebral vascular accident)    Remote hx of CVA  Continue ASA and  Statin          Essential hypertension    Continue Coreg & amlodipine  Hydralazine 10 mg IV every 6 hours prn SBP > 170  Controlled          Type 2 diabetes mellitus with other specified complication    Hold metformin  Sliding scale insulin  Diabetic diet          COPD (chronic obstructive pulmonary disease)    Supplemental oxygen to maintain sat > 92 %  DuoNebs prn            VTE Risk Mitigation         Ordered     heparin (porcine) injection 5,000 Units  Every 8 hours     Route:  Subcutaneous        10/13/17 2032              Tomasa Kevin NP  Department of Hospital Medicine   [facility]

## 2017-10-15 NOTE — PLAN OF CARE
Initial assessment completed. Met with patient and family . Patient denies any post hospital needs or services at this time.Transitional Care Folder, Discharge Planning Begins on Admission pamphlet, [facility] Delivery pamphlet, Advance Directive information given to patient along with the contact information for Galina OSHEA who will be this patient's . Instructed patient or daughter to call with any questions or concerns.       10/15/17 1030   Discharge Assessment   Assessment Type Discharge Planning Assessment   Confirmed/corrected address and phone number on facesheet? Yes   Assessment information obtained from? Patient;Caregiver;Medical Record   Expected Length of Stay (days) (tbd)   Communicated expected length of stay with patient/caregiver yes   Prior to hospitilization cognitive status: Unable to Assess   Prior to hospitalization functional status: Assistive Equipment   Current cognitive status: Unable to Assess   Current Functional Status: Assistive Equipment;Needs Assistance   Facility Arrived From: home   Lives With child(denisha), adult   Able to Return to Prior Arrangements unable to determine at this time (comments)   Is patient able to care for self after discharge? Unable to determine at this time (comments)   Who are your caregiver(s) and their phone number(s)? Raissa Hamm ( daughter ) [phone]   Patient's perception of discharge disposition home or selfcare;home health;skilled nursing facility  (TBD)   Readmission Within The Last 30 Days no previous admission in last 30 days   Patient currently being followed by outpatient case management? No   Patient currently receives any other outside agency services? No   Equipment Currently Used at Home none   Do you have any problems affording any of your prescribed medications? No   Is the patient taking medications as prescribed? yes   Does the patient have transportation home? Yes   Transportation Available car;family or  friend will provide   Does the patient receive services at the [facility]? No   Discharge Plan A Home;Home Health;Home with family   Discharge Plan B Skilled Nursing Facility   Patient/Family In Agreement With Plan yes

## 2017-10-15 NOTE — SUBJECTIVE & OBJECTIVE
Interval History: Patient lying in bed talking with family. Family reports pt. Is not back to baseline. Patient still continues to be confused at times. Will continue to monitor.     Review of Systems   Constitutional: Positive for appetite change. Negative for activity change, chills, diaphoresis, fatigue and fever.   HENT: Negative.    Eyes: Negative for pain, redness and visual disturbance.   Respiratory: Negative for cough, shortness of breath and wheezing.    Cardiovascular: Negative for chest pain, palpitations and leg swelling.   Gastrointestinal: Positive for abdominal pain, nausea and vomiting. Negative for anal bleeding, blood in stool, constipation and diarrhea.   Genitourinary: Negative for dysuria, frequency and hematuria.        Incontinence requiring indwelling cath   Musculoskeletal: Positive for back pain. Negative for arthralgias and myalgias.   Skin: Negative for pallor, rash and wound.   Neurological: Negative for dizziness, weakness, light-headedness and headaches.   Psychiatric/Behavioral: Positive for confusion.     Objective:     Vital Signs (Most Recent):  Temp: 99 °F (37.2 °C) (10/15/17 0805)  Pulse: 90 (10/15/17 0805)  Resp: 18 (10/15/17 0805)  BP: 139/70 (10/15/17 0805)  SpO2: 95 % (10/15/17 0805) Vital Signs (24h Range):  Temp:  [98.2 °F (36.8 °C)-99 °F (37.2 °C)] 99 °F (37.2 °C)  Pulse:  [] 90  Resp:  [17-20] 18  SpO2:  [93 %-96 %] 95 %  BP: (139-186)/(56-84) 139/70     Weight: 51.6 kg (113 lb 12.1 oz)  Body mass index is 24.62 kg/m².    Intake/Output Summary (Last 24 hours) at 10/15/17 1135  Last data filed at 10/15/17 0800   Gross per 24 hour   Intake          2396.25 ml   Output             2525 ml   Net          -128.75 ml      Physical Exam   Constitutional: She appears well-developed and well-nourished.   HENT:   Head: Normocephalic and atraumatic.   Nose: Nose normal.   Mouth/Throat: Oropharynx is clear and moist.   Eyes: Conjunctivae are normal. Pupils are equal, round,  and reactive to light. No scleral icterus.   Neck: Normal range of motion. Neck supple.   Cardiovascular: Normal rate, regular rhythm and normal heart sounds.  Exam reveals no gallop and no friction rub.    No murmur heard.  Pulmonary/Chest: Effort normal and breath sounds normal.   Abdominal: Soft. Bowel sounds are normal.   Musculoskeletal: Normal range of motion. She exhibits no edema or tenderness.   Left flank pain, resolved    Neurological: She is alert. She is disoriented (time ).   Confused at times    Skin: Skin is warm and dry.   Psychiatric: She has a normal mood and affect. Her behavior is normal.   Nursing note and vitals reviewed.      Significant Labs:   Blood Culture:   Recent Labs  Lab 10/13/17  1725 10/13/17  1736   LABBLOO No Growth to date  No Growth to date No Growth to date  No Growth to date     BMP:   Recent Labs  Lab 10/15/17  0511   *      K 3.5      CO2 21*   BUN 11   CREATININE 0.8   CALCIUM 9.3     CBC:   Recent Labs  Lab 10/13/17  1727 10/14/17  1637 10/15/17  0511   WBC 14.81* 8.34 10.99  10.99   HGB 11.1* 9.8* 10.5*  10.5*   HCT 33.7* 30.4* 32.5*  32.5*    309 377*  377*     CMP:   Recent Labs  Lab 10/13/17  1727 10/15/17  0511   * 138   K 3.9 3.5   CL 93* 107   CO2 22* 21*   * 133*   BUN 28* 11   CREATININE 1.3 0.8   CALCIUM 9.9 9.3   ANIONGAP 14 10   EGFRNONAA 39* >60     Urine Culture:   Recent Labs  Lab 10/13/17  1830   LABURIN YEAST > 100,000 cfu/mlIdentification pending     Urine Studies:   Recent Labs  Lab 10/13/17  1831   COLORU Yellow   APPEARANCEUA Cloudy*   PHUR 7.0   SPECGRAV <=1.005*   PROTEINUA 1+*   GLUCUA Negative   KETONESU Negative   BILIRUBINUA Negative   OCCULTUA 3+*   NITRITE Negative   UROBILINOGEN Negative   LEUKOCYTESUR 3+*   RBCUA 20*   WBCUA >100*   BACTERIA Moderate*   HYALINECASTS 0     All pertinent labs within the past 24 hours have been reviewed.    Significant Imaging:   Imaging Results          CT Renal  Stone Study Abd Pelvis WO (Final result)  Result time 10/13/17 21:22:33    Final result by Angelica Euceda MD (Timothy) (10/13/17 21:22:33)                 Impression:         There is marked bilateral hydronephrosis.  There is gas noted in the collecting systems bilaterally which could be related to gas forming infection.  A Ardon catheter in the bladder.    No acute bowel abnormalities.      All ct exams at this facility use dose modulation, iterative reconstruction, and/or weight based dosing to reduce radiation dose to as low as reasonably achievable.      Electronically signed by: ANGELICA EUCEDA MD  Date:     10/13/17  Time:    21:22              Narrative:    CT of the abdomen and pelvis without contrast    Clinical History:     Right flank pain    Findings:   Comparison 07/31/2011.    Lung bases are clear.  Small hiatal hernia.        The liver, the spleen, and the pancreas appear normal.  Prior cholecystectomy.    There is interval development of marked hydronephrosis involving the kidneys bilaterally.  There is gas noted in the collecting systems bilaterally which is suspicious for hilar nephritis and infection.  No stones.  The bladder is contracted with a Ardon catheter in place.    There are no acute bowel abnormalities.  Normal appendix.      No abnormal masses or fluid collections in the pelvis.

## 2017-10-15 NOTE — ASSESSMENT & PLAN NOTE
IV bolus - 30 ml/kg - given in the ED  WBC 14, mild mental status changes  Lactic acid normal - will repeat every 4 hours x 2  Repeat Blood cultures show NGTD  Urine culture shows yeast >100,000  IV Aztreonam and Flagyl   Pt. Afebrile  WBC's normal

## 2017-10-15 NOTE — ASSESSMENT & PLAN NOTE
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

## 2017-10-15 NOTE — PLAN OF CARE
Problem: Patient Care Overview  Goal: Plan of Care Review  Outcome: Ongoing (interventions implemented as appropriate)  Fall prevention precautions maintained, pt remained free of falls throughout shift, call bell and personal items within reach, chronic indwelling arroyo in place, pt agitated and wanting to go home. 24 hour chart check completed. Will continue to monitor

## 2017-10-15 NOTE — PLAN OF CARE
Problem: Patient Care Overview  Goal: Plan of Care Review  Outcome: Ongoing (interventions implemented as appropriate)  Pt remains free from falls and injuries bed lock call light within reach. Family remains at bedside. IVF and  antibiotics given as ordered. Blood glucose monitioring done no supplemental insulin given per sliding scale. Ardon remains in place. POC reviewed with pt and family verbalized understanding and teach back. 12 hr chart check complete.

## 2017-10-16 ENCOUNTER — TELEPHONE (OUTPATIENT)
Dept: UROLOGY | Facility: CLINIC | Age: 82
End: 2017-10-16

## 2017-10-16 VITALS
RESPIRATION RATE: 18 BRPM | OXYGEN SATURATION: 96 % | WEIGHT: 113.75 LBS | BODY MASS INDEX: 24.54 KG/M2 | HEART RATE: 84 BPM | TEMPERATURE: 98 F | HEIGHT: 57 IN | DIASTOLIC BLOOD PRESSURE: 63 MMHG | SYSTOLIC BLOOD PRESSURE: 137 MMHG

## 2017-10-16 LAB
ANION GAP SERPL CALC-SCNC: 11 MMOL/L
BASOPHILS # BLD AUTO: 0.08 K/UL
BASOPHILS # BLD AUTO: 0.08 K/UL
BASOPHILS NFR BLD: 0.8 %
BASOPHILS NFR BLD: 0.8 %
BUN SERPL-MCNC: 11 MG/DL
CALCIUM SERPL-MCNC: 8.9 MG/DL
CHLORIDE SERPL-SCNC: 109 MMOL/L
CO2 SERPL-SCNC: 19 MMOL/L
CREAT SERPL-MCNC: 0.8 MG/DL
DIFFERENTIAL METHOD: ABNORMAL
DIFFERENTIAL METHOD: ABNORMAL
EOSINOPHIL # BLD AUTO: 0.3 K/UL
EOSINOPHIL # BLD AUTO: 0.3 K/UL
EOSINOPHIL NFR BLD: 2.7 %
EOSINOPHIL NFR BLD: 2.7 %
ERYTHROCYTE [DISTWIDTH] IN BLOOD BY AUTOMATED COUNT: 15.8 %
ERYTHROCYTE [DISTWIDTH] IN BLOOD BY AUTOMATED COUNT: 15.8 %
EST. GFR  (AFRICAN AMERICAN): >60 ML/MIN/1.73 M^2
EST. GFR  (NON AFRICAN AMERICAN): >60 ML/MIN/1.73 M^2
GLUCOSE SERPL-MCNC: 141 MG/DL
HCT VFR BLD AUTO: 31.3 %
HCT VFR BLD AUTO: 31.3 %
HGB BLD-MCNC: 9.9 G/DL
HGB BLD-MCNC: 9.9 G/DL
LYMPHOCYTES # BLD AUTO: 1.8 K/UL
LYMPHOCYTES # BLD AUTO: 1.8 K/UL
LYMPHOCYTES NFR BLD: 18.5 %
LYMPHOCYTES NFR BLD: 18.5 %
MCH RBC QN AUTO: 26.5 PG
MCH RBC QN AUTO: 26.5 PG
MCHC RBC AUTO-ENTMCNC: 31.6 G/DL
MCHC RBC AUTO-ENTMCNC: 31.6 G/DL
MCV RBC AUTO: 84 FL
MCV RBC AUTO: 84 FL
MONOCYTES # BLD AUTO: 1.1 K/UL
MONOCYTES # BLD AUTO: 1.1 K/UL
MONOCYTES NFR BLD: 10.9 %
MONOCYTES NFR BLD: 10.9 %
NEUTROPHILS # BLD AUTO: 6.5 K/UL
NEUTROPHILS # BLD AUTO: 6.5 K/UL
NEUTROPHILS NFR BLD: 67.1 %
NEUTROPHILS NFR BLD: 67.1 %
PLATELET # BLD AUTO: 369 K/UL
PLATELET # BLD AUTO: 369 K/UL
PMV BLD AUTO: 9.4 FL
PMV BLD AUTO: 9.4 FL
POCT GLUCOSE: 148 MG/DL (ref 70–110)
POCT GLUCOSE: 177 MG/DL (ref 70–110)
POTASSIUM SERPL-SCNC: 3.1 MMOL/L
RBC # BLD AUTO: 3.74 M/UL
RBC # BLD AUTO: 3.74 M/UL
SODIUM SERPL-SCNC: 139 MMOL/L
WBC # BLD AUTO: 9.73 K/UL
WBC # BLD AUTO: 9.73 K/UL

## 2017-10-16 PROCEDURE — 25000003 PHARM REV CODE 250: Performed by: NURSE PRACTITIONER

## 2017-10-16 PROCEDURE — 80048 BASIC METABOLIC PNL TOTAL CA: CPT

## 2017-10-16 PROCEDURE — 25000003 PHARM REV CODE 250: Performed by: EMERGENCY MEDICINE

## 2017-10-16 PROCEDURE — S0073 INJECTION, AZTREONAM, 500 MG: HCPCS | Performed by: EMERGENCY MEDICINE

## 2017-10-16 PROCEDURE — 94640 AIRWAY INHALATION TREATMENT: CPT

## 2017-10-16 PROCEDURE — 99900035 HC TECH TIME PER 15 MIN (STAT)

## 2017-10-16 PROCEDURE — S0030 INJECTION, METRONIDAZOLE: HCPCS | Performed by: NURSE PRACTITIONER

## 2017-10-16 PROCEDURE — 63600175 PHARM REV CODE 636 W HCPCS: Performed by: NURSE PRACTITIONER

## 2017-10-16 PROCEDURE — 36415 COLL VENOUS BLD VENIPUNCTURE: CPT

## 2017-10-16 PROCEDURE — 85025 COMPLETE CBC W/AUTO DIFF WBC: CPT

## 2017-10-16 PROCEDURE — 63600175 PHARM REV CODE 636 W HCPCS: Performed by: EMERGENCY MEDICINE

## 2017-10-16 PROCEDURE — 25000242 PHARM REV CODE 250 ALT 637 W/ HCPCS: Performed by: NURSE PRACTITIONER

## 2017-10-16 RX ORDER — SULFAMETHOXAZOLE AND TRIMETHOPRIM 800; 160 MG/1; MG/1
1 TABLET ORAL 2 TIMES DAILY
Status: DISCONTINUED | OUTPATIENT
Start: 2017-10-16 | End: 2017-10-16 | Stop reason: HOSPADM

## 2017-10-16 RX ORDER — POTASSIUM CHLORIDE 20 MEQ/1
60 TABLET, EXTENDED RELEASE ORAL ONCE
Status: COMPLETED | OUTPATIENT
Start: 2017-10-16 | End: 2017-10-16

## 2017-10-16 RX ORDER — SULFAMETHOXAZOLE AND TRIMETHOPRIM 800; 160 MG/1; MG/1
1 TABLET ORAL 2 TIMES DAILY
Qty: 20 TABLET | Refills: 0 | Status: SHIPPED | OUTPATIENT
Start: 2017-10-16 | End: 2017-10-26

## 2017-10-16 RX ORDER — POTASSIUM CHLORIDE 20 MEQ/1
20 TABLET, EXTENDED RELEASE ORAL DAILY
Qty: 3 TABLET | Refills: 0 | Status: SHIPPED | OUTPATIENT
Start: 2017-10-16 | End: 2017-10-19

## 2017-10-16 RX ADMIN — CARVEDILOL 6.25 MG: 3.12 TABLET, FILM COATED ORAL at 08:10

## 2017-10-16 RX ADMIN — POTASSIUM CHLORIDE 60 MEQ: 1500 TABLET, EXTENDED RELEASE ORAL at 02:10

## 2017-10-16 RX ADMIN — ARFORMOTEROL TARTRATE 15 MCG: 15 SOLUTION RESPIRATORY (INHALATION) at 07:10

## 2017-10-16 RX ADMIN — METRONIDAZOLE 500 MG: 500 INJECTION, SOLUTION INTRAVENOUS at 08:10

## 2017-10-16 RX ADMIN — AMLODIPINE BESYLATE 10 MG: 10 TABLET ORAL at 08:10

## 2017-10-16 RX ADMIN — METRONIDAZOLE 500 MG: 500 INJECTION, SOLUTION INTRAVENOUS at 01:10

## 2017-10-16 RX ADMIN — AZTREONAM 500 MG: 1 INJECTION, POWDER, LYOPHILIZED, FOR SOLUTION INTRAMUSCULAR; INTRAVENOUS at 08:10

## 2017-10-16 RX ADMIN — PANTOPRAZOLE SODIUM 40 MG: 40 TABLET, DELAYED RELEASE ORAL at 08:10

## 2017-10-16 RX ADMIN — FERROUS SULFATE TAB EC 325 MG (65 MG FE EQUIVALENT) 325 MG: 325 (65 FE) TABLET DELAYED RESPONSE at 08:10

## 2017-10-16 RX ADMIN — ASPIRIN 81 MG: 81 TABLET, COATED ORAL at 08:10

## 2017-10-16 RX ADMIN — HEPARIN SODIUM 5000 UNITS: 5000 INJECTION, SOLUTION INTRAVENOUS; SUBCUTANEOUS at 05:10

## 2017-10-16 RX ADMIN — BUDESONIDE 0.5 MG: 0.5 SUSPENSION RESPIRATORY (INHALATION) at 07:10

## 2017-10-16 RX ADMIN — SERTRALINE HYDROCHLORIDE 50 MG: 50 TABLET ORAL at 08:10

## 2017-10-16 RX ADMIN — PRAVASTATIN SODIUM 10 MG: 10 TABLET ORAL at 08:10

## 2017-10-16 NOTE — PLAN OF CARE
Problem: Patient Care Overview  Goal: Plan of Care Review  Outcome: Ongoing (interventions implemented as appropriate)  Patient tolerating breathing treatments well.

## 2017-10-16 NOTE — TELEPHONE ENCOUNTER
Attempted to contact pt to inform her of her pending appt on 10/30/17 with Dr. Nathan; no answer.  Msg left on voice mail.

## 2017-10-16 NOTE — PLAN OF CARE
Pt discharging home with no CM needs as anticipated.  CM made f/u appt with Dr. Arvin Nathan in urology for the pt, and information is listed on AVS.         10/16/17 1433   Final Note   Assessment Type Final Discharge Note   Discharge Disposition Home   What phone number can be called within the next 1-3 days to see how you are doing after discharge? [phone]   Hospital Follow Up  Appt(s) scheduled? Yes   Discharge plans and expectations educations in teach back method with documentation complete? Yes   Right Care Referral Info   Post Acute Recommendation No Care

## 2017-10-16 NOTE — DISCHARGE SUMMARY
[facility] Hospital Medicine  Discharge Summary      Patient Name: Jessica Hamm  MRN: [id number]  Admission Date: 10/13/2017  Hospital Length of Stay: 3 days  Discharge Date and Time:  10/16/2017 1:05 PM  Attending Physician: Ever Landin MD   Discharging Provider: Tomasa Kevin NP  Primary Care Provider: Milo Gil MD      HPI:   Jessica Hamm is a 83 yo female with chronic indwelling cath, DM II, HTN, COPD, HPL, remote hx of CVA and hx of PE who was seen in the ED yesterday and found to have UTI and discharged with prescription for Cipro. Pt was called today with + blood cultures with gram negative bacteria. Yesterday, pt was noted to be disoriented and not her usual self. CT of Head was negative for acute findings. Today, the granddaughter states her mind is more clear. They deny fever, chills, chest pain, SOB, cough and palpitations. Pt describes right sided lumbar pain that radiates toward the pelvis - this is a new pain.  Labs note leukocytosis, normocytic anemia, hyponatremia & decreased GFR. U/A finds > 100 WBC. Pt is admitted for treatment of gram negative bacteremia with IV ABX - urine culture/blood cultures pending. CT ABD/pelvis pending.     * No surgery found *      Indwelling Lines/Drains at time of discharge:   Lines/Drains/Airways     Drain                 Urethral Catheter 07/11/16 462 days         Urethral Catheter 10/13/17 0000 3 days              Hospital Course:   82 year old female admitted for Sepsis due to Gram negative bacteria. In ED, labs revealed leukocytosis, normocytic anemia, hyponatremia & decreased GFR. U/A finds > 100 WBC. Urine culture/blood cultures. Sepsis protocol activated in the ED. IV abx started. CT ABD/pelvis revealed bilateral hydronephrosis.There is gas noted in the collecting systems bilaterally which could be related to gas forming infection. Patient remains afebrile. WBC's normal. Repeat Blood cultures show NGTD. Urine culture  shows yeast >100,000. Awaiting sensitivities. Will continue current IV abx management. VSS. Potassium 3.1, will replace accordingly. Sensitivities back will discharge on Bactrim DS po x 10 days. Symptoms improving, pt. Back to baseline. Case discussed with Dr. Landin. Home medications reconciled. New prescriptions given. Patient to follow-up with PCP in 3-5 days for hospital follow-up and repeat labs. Patient also to follow-up with urology in 1 week for hospital follow-up. Patient seen and examined on the date of discharge and found suitable.      Consults:     Significant Diagnostic Studies: Labs:   BMP:   Recent Labs  Lab 10/15/17  0511 10/16/17  0500   * 141*    139   K 3.5 3.1*    109   CO2 21* 19*   BUN 11 11   CREATININE 0.8 0.8   CALCIUM 9.3 8.9   , CMP   Recent Labs  Lab 10/15/17  0511 10/16/17  0500    139   K 3.5 3.1*    109   CO2 21* 19*   * 141*   BUN 11 11   CREATININE 0.8 0.8   CALCIUM 9.3 8.9   ANIONGAP 10 11   ESTGFRAFRICA >60 >60   EGFRNONAA >60 >60   , CBC   Recent Labs  Lab 10/14/17  1637 10/15/17  0511 10/16/17  0500   WBC 8.34 10.99  10.99 9.73  9.73   HGB 9.8* 10.5*  10.5* 9.9*  9.9*   HCT 30.4* 32.5*  32.5* 31.3*  31.3*    377*  377* 369*  369*    and All labs within the past 24 hours have been reviewed    Pending Diagnostic Studies:     None        Final Active Diagnoses:    Diagnosis Date Noted POA    PRINCIPAL PROBLEM:  Sepsis due to Gram negative bacteria [A41.50] 10/13/2017 Unknown    Urinary tract infection associated with indwelling urethral catheter [T83.511A, N39.0] 10/15/2017 Yes    COPD (chronic obstructive pulmonary disease) [J44.9] 10/13/2017 Unknown    Type 2 diabetes mellitus with other specified complication [E11.69] 10/13/2017 Unknown    Essential hypertension [I10] 10/13/2017 Unknown    CVA (cerebral vascular accident) [I63.9] 10/13/2017 Unknown      Problems Resolved During this Admission:    Diagnosis Date Noted  Date Resolved POA      Discharged Condition: stable    Disposition: Home or Self Care    Follow Up:  Follow-up Information     Milo Gil MD In 3 days.    Specialty:  Internal Medicine  Why:  hospital follow-up in 3-5 days   Contact information:  [address] [place]  [facility] [address]  [phone]             Moiz Dupree IV, MD In 1 week.    Specialty:  Urology  Why:  hospital follow-up   Contact information:  [address] [facility] [place] [address] [phone]             Moiz Dupree IV, MD .    Specialty:  Urology  Contact information:  [address] [facility] [place] [id number] [phone]                 Patient Instructions:     Ambulatory Referral to Urology   Referral Priority: Routine Referral Type: Consultation   Referral Reason: Specialty Services Required    Referred to Provider: MOIZ DUPREE IV Requested Specialty: Urology   Number of Visits Requested: 1      Diet general     Activity as tolerated     Call MD for:  temperature >100.4     Call MD for:  persistent nausea and vomiting or diarrhea     Call MD for:  severe uncontrolled pain     Call MD for:  difficulty breathing or increased cough     Call MD for:  severe persistent headache     Call MD for:  persistent dizziness, light-headedness, or visual disturbances     Call MD for:  increased confusion or weakness       Medications:  Reconciled Home Medications:   Current Discharge Medication List      START taking these medications    Details   potassium chloride SA (K-DUR,KLOR-CON) 20 MEQ tablet Take 1 tablet (20 mEq total) by mouth once daily.  Qty: 3 tablet, Refills: 0      sulfamethoxazole-trimethoprim 800-160mg (BACTRIM DS) 800-160 mg Tab Take 1 tablet by mouth 2 (two) times daily.  Qty: 20 tablet, Refills: 0         CONTINUE these medications which have NOT CHANGED    Details   albuterol (PROVENTIL) 2.5 mg /3 mL (0.083 %) nebulizer solution Inhale 2.5 mg into the lungs.      amlodipine (NORVASC) 10 MG tablet  Take 10 mg by mouth once daily.       aspirin (ECOTRIN) 81 MG EC tablet Take 81 mg by mouth once daily.       carvedilol (COREG) 6.25 MG tablet Take 6.25 mg by mouth.      darifenacin (ENABLEX) 7.5 MG Tb24 Take 7.5 mg by mouth.      esomeprazole magnesium 22.3 mg CpDR Take 22.3 mg by mouth.      ferrous sulfate 324 mg (65 mg iron) TbEC Take 324 mg by mouth 2 (two) times daily.       fluticasone-vilanterol (BREO) 200-25 mcg/dose DsDv diskus inhaler Inhale 1 puff into the lungs.      hyoscyamine (LEVSIN/SL) 0.125 mg Subl TAKE 1 TABLET (0.125 MG TOTAL) BY MOUTH EVERY 4 (FOUR) HOURS AS NEEDED FOR CRAMPING (BLADDER SPASMS,      labetalol (NORMODYNE) 100 MG tablet       metformin (GLUCOPHAGE) 500 MG tablet       omeprazole (PRILOSEC) 40 MG capsule       pravastatin (PRAVACHOL) 10 MG tablet Take 10 mg by mouth once daily.       sertraline (ZOLOFT) 25 MG tablet Take 50 mg by mouth once daily.       nebulizer accessories Kit by Miscellaneous route. Use as directed      terconazole (TERAZOL 7) 0.4 % Crea Place 1 Applicatorful vaginally.      tobramycin-dexamethasone 0.3-0.1% (TOBRADEX) 0.3-0.1 % DrpS          STOP taking these medications       benzonatate (TESSALON) 200 MG capsule Comments:   Reason for Stopping:         ciprofloxacin HCl (CIPRO) 250 MG tablet Comments:   Reason for Stopping:         cetirizine (ZYRTEC) 10 MG tablet Comments:   Reason for Stopping:         warfarin (COUMADIN) 5 MG tablet Comments:   Reason for Stopping:         XARELTO 20 mg Tab Comments:   Reason for Stopping:             Time spent on the discharge of patient: >30 minutes      Tomasa Kevin NP  Department of Hospital Medicine  [facility]

## 2017-10-16 NOTE — PLAN OF CARE
Problem: Patient Care Overview  Goal: Plan of Care Review  Outcome: Ongoing (interventions implemented as appropriate)  Family at bedside. Pt rested during shift. Pt turned Q2 during shift. During bedside report, skin assessment performed. Non-blanchable redness noted to buttocks and sacral area. Pt turned Q2 during shift. VSS. IVF and IV ABX during shift. Pt denied pain during shift. Will continue to monitor.

## 2017-10-16 NOTE — PLAN OF CARE
Problem: Patient Care Overview  Goal: Plan of Care Review  Outcome: Ongoing (interventions implemented as appropriate)  Pt is maintaining adequate SpO2 levels on room air.Pt is receiving nebulized medications as ordered with NARN. Pt not requiring/requesting PRN respiratory treatments at this time.

## 2017-10-16 NOTE — PLAN OF CARE
Problem: Patient Care Overview  Goal: Plan of Care Review  Outcome: Ongoing (interventions implemented as appropriate)  Discharge orders. Patient remained free from injury. No complaints at this time. Discharge instructions given. Verbalized understanding. IV removed. Cath tip in place. Indwelling catheter in place and secured. Off floor via wheel chair accompanied by family and staff.

## 2017-10-17 LAB — BACTERIA BLD CULT: NORMAL

## 2017-10-18 ENCOUNTER — PATIENT OUTREACH (OUTPATIENT)
Dept: ADMINISTRATIVE | Facility: CLINIC | Age: 82
End: 2017-10-18

## 2017-10-18 NOTE — PROGRESS NOTES
Discharge Information     Discharge Date:  10/16/17          Primary Discharge Diagnosis: Sepsis due to Gram negative bacteria          Collette Guthrie RN attempted to contact patient. No answer. The following message was left for the patient to return the call:  Good afternoon, I am a nurse calling on behalf of [facility] from the [facility].  This is a Transitional Care Call for Jessica Hamm. When you have a moment please contact us at [phone] or [phone] Monday through Friday, between the hours of 8 am to 4 pm. We look forward to speaking with you. On behalf of [facility] have a nice day.    The patient has a scheduled FU appointment with Dr. Nathan (Urology) on 10/30/17 @ 0820hrs. No HOSPFU appointment with PCP - unable to route message to PCP staff - non [facility] provider.

## 2017-10-18 NOTE — PATIENT INSTRUCTIONS
Bacteremia, Suspected (Adult)  Your fever today is probably due to a viral illness. However, sometimes fever can be an early sign of a more serious bacterial infection. Bacteremia is a bacterial infection that has spread to the bloodstream. This is serious because it can spread to other organs, including the kidneys, brain, and lungs.  Your healthcare provider will perform tests (cultures) to check for bacteremia. Until the test results are known you should watch for the signs listed below.  Causes  Bacteremia usually starts with a typical infection, but it then spreads to the blood. Almost any type of infection can cause bacteremia, including a urinary tract infection, skin infection, gastrointestinal problem, surgical complication, or pneumonia.  Symptoms  At first symptoms may seem like any typical infection or illness, but then they worsen. Symptoms of bacteremia can include:  · Fever and chills  · Loss of appetite  · Nausea or vomiting  · Trouble breathing or fast breathing  · Fast heart rate  · Feeling lightheaded or faint  · Skin rashes or blotches  Home care  Follow these guidelines when caring for yourself at home.  · Rest at home for the first 2 to 3 days. When resuming activity, don't let yourself become overly tired.  · You can take acetaminophen or ibuprofen for pain, unless you were given a different pain medicine to use. (Note: If you have chronic liver or kidney disease or have ever had a stomach ulcer or gastrointestinal bleeding, talk with your healthcare provider before using these medicines. Also talk to your provider if you are taking medicine to prevent blood clots.) Aspirin should never be given to anyone younger than 18 years of age who is ill with a viral infection or fever. It may cause severe liver or brain damage.  · If you were given antibiotics, take them until they are used up, or your healthcare provider tells you to stop. It is important to finish the antibiotics even though you feel  better. This is to make sure the infection has cleared.  · Your appetite may be poor, so a light diet is fine. Avoid dehydration by drinking 6 to 8 glasses of fluid per day (such as water, soft drinks, sports drinks, juices, tea, or soup).  Follow-up care  Follow up with your healthcare provider, or as advised.  · If a culture was done, you will be notified if your treatment needs to be changed. You can call as directed for the results.  · If X-rays, a CT, or an ultrasound were done, a specialist will review them. You will be notified of any findings that may affect your care.  Call 911  Contact emergency services right away if any of these occur:  · Trouble breathing or swallowing, or wheezing  · Chest pain  · Confusion or sudden change in behavior  · Extreme drowsiness or trouble awakening  · Fainting or loss of consciousness  · Rapid heart rate  · Low blood pressure  · Vomiting blood, or large amounts of blood in stool  · Seizure  When to seek medical advice  Call your healthcare provider right away if any of these occur:  · Cough with lots of colored sputum (mucus), or blood in your sputum  · Severe headache  · Severe face, neck, throat, or ear pain  · Pain in the right lower abdomen  · Weakness, dizziness, repeated vomiting, or diarrhea  · Joint pain or a new rash  · Burning when urinating  · Fever of 100.4°F (38°C) or higher  Date Last Reviewed: 7/30/2015  © 9110-6041 [employer]. [address] [place] [address], [place] [address]. All rights reserved. This information is not intended as a substitute for professional medical care. Always follow your healthcare professional's instructions.

## 2017-10-18 NOTE — PROGRESS NOTES
[facility] Script completed with patient [facility] FU with PCP Dr Young on 10/24/17 @ 1000 hrs reports daughter Raissa

## 2017-10-19 LAB
BACTERIA BLD CULT: NORMAL
BACTERIA BLD CULT: NORMAL

## 2017-10-30 ENCOUNTER — OFFICE VISIT (OUTPATIENT)
Dept: UROLOGY | Facility: CLINIC | Age: 82
End: 2017-10-30
Payer: COMMERCIAL

## 2017-10-30 VITALS
SYSTOLIC BLOOD PRESSURE: 123 MMHG | BODY MASS INDEX: 24.38 KG/M2 | HEART RATE: 82 BPM | HEIGHT: 57 IN | WEIGHT: 113 LBS | DIASTOLIC BLOOD PRESSURE: 72 MMHG

## 2017-10-30 DIAGNOSIS — N31.9 NEUROGENIC BLADDER: Primary | ICD-10-CM

## 2017-10-30 PROCEDURE — 99204 OFFICE O/P NEW MOD 45 MIN: CPT | Mod: S$GLB,,, | Performed by: UROLOGY

## 2017-10-30 PROCEDURE — 99999 PR PBB SHADOW E&M-EST. PATIENT-LVL II: CPT | Mod: PBBFAC,,, | Performed by: UROLOGY

## 2017-10-30 NOTE — PROGRESS NOTES
Chief Complaint: UTI    HPI:   10/30/17: 81 yo woman was hosp a few weeks ago with UTI and bacteremia (providentia).  Finished her Abx yesterday.  No abd/pelvic pain and no exac/rel factors today.  Has a chronic arroyo (>2 years) due to neurogenic bladder after cervical cancer and XRT/chemo in 1974.  Chronic UTI.   No hematuria.  No urolithiasis.  Recent CT normal.  Arroyo being changed q2wks due to UTI frequency. No urinary bother.  She was evaluated by Dr. Amborsio as having a 2 oz bladder and constantly leak.    Allergies:  Demerol [meperidine] and Penicillins    Medications: has a current medication list which includes the following prescription(s): albuterol, amlodipine, aspirin, carvedilol, ferrous sulfate, fluticasone-vilanterol, hyoscyamine, metformin, nebulizer accessories, pravastatin, sertraline, and omeprazole.    Review of Systems:  General: No fever, chills, fatigability, or weight loss.  Skin: No rashes, itching, or changes in color or texture of skin.  Chest: Denies SHAW, cyanosis, wheezing, cough, and sputum production.  Abdomen: Appetite fine. No weight loss. Denies diarrhea, abdominal pain, hematemesis, or blood in stool.  Musculoskeletal: No joint stiffness or swelling. Denies back pain.  : As above.  All other review of systems negative.    PMH:   has a past medical history of Cancer (1974); COPD (chronic obstructive pulmonary disease); Diabetes mellitus; Hypertension; and Stroke (1995).    PSH:   has a past surgical history that includes Hysterectomy; Cholecystectomy; Back surgery; Knee arthroscopy (Bilateral); and carpal tunnel (Bilateral).    FamHx: family history is not on file.    SocHx:  reports that she has never smoked. She has never used smokeless tobacco. She reports that she does not drink alcohol or use drugs.     Physical Exam:  Vitals:   Vitals:    10/30/17 0852   BP: 123/72   Pulse: 82     General: A&Ox3. No apparent distress. No deformities.  Neck: No masses. Normal thyroid.  Lungs:  normal inspiration. No use of accessory muscles.  Heart: normal pulse. No arrhythmias.  Abdomen: Soft. NT. ND. No masses. No hernias. No hepatosplenomegaly.  Lymphatic: Neck and groin nodes negative.  Skin: The skin is warm and dry. No jaundice.  Ext: No c/c/e.  : deferred    Labs/Studies:     Impression/Plan:   1. RTC for cysto and possible workup for SP tube was my recc; pt desires to keep the catheter.  Recc showers.  Other hygeine reccs made.  2. Pt will call when she needs us.

## 2019-11-18 ENCOUNTER — HOSPITAL ENCOUNTER (INPATIENT)
Facility: HOSPITAL | Age: 84
LOS: 2 days | Discharge: HOME HEALTH | DRG: 699 | End: 2019-11-20
Attending: EMERGENCY MEDICINE | Admitting: INTERNAL MEDICINE
Payer: MEDICARE

## 2019-11-18 DIAGNOSIS — R79.89 CREATININE ELEVATION: ICD-10-CM

## 2019-11-18 DIAGNOSIS — R40.20 LOSS OF CONSCIOUSNESS: ICD-10-CM

## 2019-11-18 DIAGNOSIS — N39.0 ACUTE UTI (URINARY TRACT INFECTION): Primary | ICD-10-CM

## 2019-11-18 DIAGNOSIS — G93.40 ENCEPHALOPATHY ACUTE: ICD-10-CM

## 2019-11-18 LAB
ALBUMIN SERPL BCP-MCNC: 2.7 G/DL (ref 3.5–5.2)
ALP SERPL-CCNC: 76 U/L (ref 55–135)
ALT SERPL W/O P-5'-P-CCNC: 6 U/L (ref 10–44)
ANION GAP SERPL CALC-SCNC: 10 MMOL/L (ref 8–16)
AST SERPL-CCNC: 13 U/L (ref 10–40)
BACTERIA #/AREA URNS HPF: ABNORMAL /HPF
BASOPHILS # BLD AUTO: 0.19 K/UL (ref 0–0.2)
BASOPHILS NFR BLD: 2.4 % (ref 0–1.9)
BILIRUB SERPL-MCNC: 0.2 MG/DL (ref 0.1–1)
BILIRUB UR QL STRIP: NEGATIVE
BUN SERPL-MCNC: 24 MG/DL (ref 8–23)
CALCIUM SERPL-MCNC: 8.1 MG/DL (ref 8.7–10.5)
CHLORIDE SERPL-SCNC: 106 MMOL/L (ref 95–110)
CLARITY UR: ABNORMAL
CO2 SERPL-SCNC: 20 MMOL/L (ref 23–29)
COLOR UR: YELLOW
CREAT SERPL-MCNC: 1.7 MG/DL (ref 0.5–1.4)
DIFFERENTIAL METHOD: ABNORMAL
EOSINOPHIL # BLD AUTO: 0.4 K/UL (ref 0–0.5)
EOSINOPHIL NFR BLD: 4.5 % (ref 0–8)
ERYTHROCYTE [DISTWIDTH] IN BLOOD BY AUTOMATED COUNT: 12.6 % (ref 11.5–14.5)
EST. GFR  (AFRICAN AMERICAN): 32 ML/MIN/1.73 M^2
EST. GFR  (NON AFRICAN AMERICAN): 27 ML/MIN/1.73 M^2
GLUCOSE SERPL-MCNC: 137 MG/DL (ref 70–110)
GLUCOSE UR QL STRIP: NEGATIVE
HCT VFR BLD AUTO: 31.5 % (ref 37–48.5)
HGB BLD-MCNC: 9.9 G/DL (ref 12–16)
HGB UR QL STRIP: ABNORMAL
HYALINE CASTS #/AREA URNS LPF: 0 /LPF
IMM GRANULOCYTES # BLD AUTO: 0.03 K/UL (ref 0–0.04)
IMM GRANULOCYTES NFR BLD AUTO: 0.4 % (ref 0–0.5)
KETONES UR QL STRIP: NEGATIVE
LEUKOCYTE ESTERASE UR QL STRIP: ABNORMAL
LYMPHOCYTES # BLD AUTO: 1.2 K/UL (ref 1–4.8)
LYMPHOCYTES NFR BLD: 14.8 % (ref 18–48)
MCH RBC QN AUTO: 28.9 PG (ref 27–31)
MCHC RBC AUTO-ENTMCNC: 31.4 G/DL (ref 32–36)
MCV RBC AUTO: 92 FL (ref 82–98)
MICROSCOPIC COMMENT: ABNORMAL
MONOCYTES # BLD AUTO: 0.5 K/UL (ref 0.3–1)
MONOCYTES NFR BLD: 6.6 % (ref 4–15)
NEUTROPHILS # BLD AUTO: 5.7 K/UL (ref 1.8–7.7)
NEUTROPHILS NFR BLD: 71.3 % (ref 38–73)
NITRITE UR QL STRIP: NEGATIVE
NRBC BLD-RTO: 0 /100 WBC
PH UR STRIP: 6 [PH] (ref 5–8)
PLATELET # BLD AUTO: 323 K/UL (ref 150–350)
PMV BLD AUTO: 10 FL (ref 9.2–12.9)
POTASSIUM SERPL-SCNC: 4.4 MMOL/L (ref 3.5–5.1)
PROT SERPL-MCNC: 7.3 G/DL (ref 6–8.4)
PROT UR QL STRIP: ABNORMAL
RBC # BLD AUTO: 3.43 M/UL (ref 4–5.4)
RBC #/AREA URNS HPF: 20 /HPF (ref 0–4)
SODIUM SERPL-SCNC: 136 MMOL/L (ref 136–145)
SP GR UR STRIP: 1.01 (ref 1–1.03)
TROPONIN I SERPL DL<=0.01 NG/ML-MCNC: 0.01 NG/ML (ref 0–0.03)
TSH SERPL DL<=0.005 MIU/L-ACNC: 2.83 UIU/ML (ref 0.4–4)
URN SPEC COLLECT METH UR: ABNORMAL
UROBILINOGEN UR STRIP-ACNC: 1 EU/DL
WBC # BLD AUTO: 7.98 K/UL (ref 3.9–12.7)
WBC #/AREA URNS HPF: >100 /HPF (ref 0–5)

## 2019-11-18 PROCEDURE — 93010 ELECTROCARDIOGRAM REPORT: CPT | Mod: ,,, | Performed by: INTERNAL MEDICINE

## 2019-11-18 PROCEDURE — 84484 ASSAY OF TROPONIN QUANT: CPT

## 2019-11-18 PROCEDURE — 81000 URINALYSIS NONAUTO W/SCOPE: CPT

## 2019-11-18 PROCEDURE — 11000001 HC ACUTE MED/SURG PRIVATE ROOM

## 2019-11-18 PROCEDURE — 99285 EMERGENCY DEPT VISIT HI MDM: CPT | Mod: 25

## 2019-11-18 PROCEDURE — 84443 ASSAY THYROID STIM HORMONE: CPT

## 2019-11-18 PROCEDURE — 96361 HYDRATE IV INFUSION ADD-ON: CPT

## 2019-11-18 PROCEDURE — 93010 EKG 12-LEAD: ICD-10-PCS | Mod: ,,, | Performed by: INTERNAL MEDICINE

## 2019-11-18 PROCEDURE — 87086 URINE CULTURE/COLONY COUNT: CPT

## 2019-11-18 PROCEDURE — 80053 COMPREHEN METABOLIC PANEL: CPT

## 2019-11-18 PROCEDURE — 93005 ELECTROCARDIOGRAM TRACING: CPT

## 2019-11-18 PROCEDURE — 36415 COLL VENOUS BLD VENIPUNCTURE: CPT

## 2019-11-18 PROCEDURE — 96365 THER/PROPH/DIAG IV INF INIT: CPT

## 2019-11-18 PROCEDURE — 96366 THER/PROPH/DIAG IV INF ADDON: CPT

## 2019-11-18 PROCEDURE — 63600175 PHARM REV CODE 636 W HCPCS: Performed by: EMERGENCY MEDICINE

## 2019-11-18 PROCEDURE — 85025 COMPLETE CBC W/AUTO DIFF WBC: CPT

## 2019-11-18 RX ORDER — LEVOFLOXACIN 5 MG/ML
750 INJECTION, SOLUTION INTRAVENOUS
Status: COMPLETED | OUTPATIENT
Start: 2019-11-18 | End: 2019-11-19

## 2019-11-18 RX ADMIN — LEVOFLOXACIN 750 MG: 750 INJECTION, SOLUTION INTRAVENOUS at 11:11

## 2019-11-18 RX ADMIN — SODIUM CHLORIDE, SODIUM LACTATE, POTASSIUM CHLORIDE, AND CALCIUM CHLORIDE 1000 ML: .6; .31; .03; .02 INJECTION, SOLUTION INTRAVENOUS at 11:11

## 2019-11-19 PROBLEM — R40.20 LOSS OF CONSCIOUSNESS: Status: ACTIVE | Noted: 2019-11-19

## 2019-11-19 PROBLEM — R40.20 LOSS OF CONSCIOUSNESS: Status: RESOLVED | Noted: 2019-11-19 | Resolved: 2019-11-19

## 2019-11-19 PROBLEM — N17.9 ACUTE RENAL INJURY: Status: ACTIVE | Noted: 2019-11-19

## 2019-11-19 LAB
ANION GAP SERPL CALC-SCNC: 6 MMOL/L (ref 8–16)
BASOPHILS # BLD AUTO: 0.18 K/UL (ref 0–0.2)
BASOPHILS NFR BLD: 2 % (ref 0–1.9)
BUN SERPL-MCNC: 20 MG/DL (ref 8–23)
CALCIUM SERPL-MCNC: 8.6 MG/DL (ref 8.7–10.5)
CHLORIDE SERPL-SCNC: 110 MMOL/L (ref 95–110)
CO2 SERPL-SCNC: 23 MMOL/L (ref 23–29)
CREAT SERPL-MCNC: 1.6 MG/DL (ref 0.5–1.4)
DIFFERENTIAL METHOD: ABNORMAL
EOSINOPHIL # BLD AUTO: 0.5 K/UL (ref 0–0.5)
EOSINOPHIL NFR BLD: 5.4 % (ref 0–8)
ERYTHROCYTE [DISTWIDTH] IN BLOOD BY AUTOMATED COUNT: 12.6 % (ref 11.5–14.5)
EST. GFR  (AFRICAN AMERICAN): 34 ML/MIN/1.73 M^2
EST. GFR  (NON AFRICAN AMERICAN): 30 ML/MIN/1.73 M^2
GLUCOSE SERPL-MCNC: 90 MG/DL (ref 70–110)
HCT VFR BLD AUTO: 32.2 % (ref 37–48.5)
HGB BLD-MCNC: 10 G/DL (ref 12–16)
IMM GRANULOCYTES # BLD AUTO: 0.03 K/UL (ref 0–0.04)
IMM GRANULOCYTES NFR BLD AUTO: 0.3 % (ref 0–0.5)
LYMPHOCYTES # BLD AUTO: 1.8 K/UL (ref 1–4.8)
LYMPHOCYTES NFR BLD: 20 % (ref 18–48)
MCH RBC QN AUTO: 28.9 PG (ref 27–31)
MCHC RBC AUTO-ENTMCNC: 31.1 G/DL (ref 32–36)
MCV RBC AUTO: 93 FL (ref 82–98)
MONOCYTES # BLD AUTO: 0.7 K/UL (ref 0.3–1)
MONOCYTES NFR BLD: 7.5 % (ref 4–15)
NEUTROPHILS # BLD AUTO: 5.8 K/UL (ref 1.8–7.7)
NEUTROPHILS NFR BLD: 64.8 % (ref 38–73)
NRBC BLD-RTO: 0 /100 WBC
PLATELET # BLD AUTO: 313 K/UL (ref 150–350)
PMV BLD AUTO: 9.4 FL (ref 9.2–12.9)
POTASSIUM SERPL-SCNC: 4.2 MMOL/L (ref 3.5–5.1)
RBC # BLD AUTO: 3.46 M/UL (ref 4–5.4)
SODIUM SERPL-SCNC: 139 MMOL/L (ref 136–145)
WBC # BLD AUTO: 9.01 K/UL (ref 3.9–12.7)

## 2019-11-19 PROCEDURE — S0073 INJECTION, AZTREONAM, 500 MG: HCPCS | Performed by: NURSE PRACTITIONER

## 2019-11-19 PROCEDURE — 99223 PR INITIAL HOSPITAL CARE,LEVL III: ICD-10-PCS | Mod: ,,, | Performed by: UROLOGY

## 2019-11-19 PROCEDURE — 25000003 PHARM REV CODE 250: Performed by: EMERGENCY MEDICINE

## 2019-11-19 PROCEDURE — 21400001 HC TELEMETRY ROOM

## 2019-11-19 PROCEDURE — 25000003 PHARM REV CODE 250: Performed by: NURSE PRACTITIONER

## 2019-11-19 PROCEDURE — 63600175 PHARM REV CODE 636 W HCPCS: Performed by: NURSE PRACTITIONER

## 2019-11-19 PROCEDURE — 80048 BASIC METABOLIC PNL TOTAL CA: CPT

## 2019-11-19 PROCEDURE — 99223 1ST HOSP IP/OBS HIGH 75: CPT | Mod: ,,, | Performed by: UROLOGY

## 2019-11-19 PROCEDURE — 85025 COMPLETE CBC W/AUTO DIFF WBC: CPT

## 2019-11-19 PROCEDURE — 87040 BLOOD CULTURE FOR BACTERIA: CPT | Mod: 59

## 2019-11-19 PROCEDURE — 96365 THER/PROPH/DIAG IV INF INIT: CPT | Mod: 59

## 2019-11-19 PROCEDURE — 25000003 PHARM REV CODE 250: Performed by: INTERNAL MEDICINE

## 2019-11-19 PROCEDURE — S0073 INJECTION, AZTREONAM, 500 MG: HCPCS | Performed by: EMERGENCY MEDICINE

## 2019-11-19 RX ORDER — ASPIRIN 81 MG/1
81 TABLET ORAL DAILY
Status: DISCONTINUED | OUTPATIENT
Start: 2019-11-19 | End: 2019-11-20 | Stop reason: HOSPADM

## 2019-11-19 RX ORDER — SODIUM CHLORIDE 9 MG/ML
INJECTION, SOLUTION INTRAVENOUS CONTINUOUS
Status: DISCONTINUED | OUTPATIENT
Start: 2019-11-19 | End: 2019-11-20 | Stop reason: HOSPADM

## 2019-11-19 RX ORDER — SULFAMETHOXAZOLE AND TRIMETHOPRIM 800; 160 MG/1; MG/1
1 TABLET ORAL 2 TIMES DAILY
Status: DISCONTINUED | OUTPATIENT
Start: 2019-11-19 | End: 2019-11-20 | Stop reason: HOSPADM

## 2019-11-19 RX ORDER — METOPROLOL SUCCINATE 25 MG/1
25 TABLET, EXTENDED RELEASE ORAL DAILY
COMMUNITY

## 2019-11-19 RX ORDER — QUETIAPINE FUMARATE 25 MG/1
TABLET, FILM COATED ORAL 2 TIMES DAILY
COMMUNITY

## 2019-11-19 RX ORDER — AMLODIPINE BESYLATE 10 MG/1
10 TABLET ORAL DAILY
Status: DISCONTINUED | OUTPATIENT
Start: 2019-11-19 | End: 2019-11-20 | Stop reason: HOSPADM

## 2019-11-19 RX ORDER — SERTRALINE HYDROCHLORIDE 50 MG/1
50 TABLET, FILM COATED ORAL DAILY
Status: DISCONTINUED | OUTPATIENT
Start: 2019-11-19 | End: 2019-11-20 | Stop reason: HOSPADM

## 2019-11-19 RX ORDER — MEMANTINE HYDROCHLORIDE 5 MG/1
5 TABLET ORAL DAILY
COMMUNITY

## 2019-11-19 RX ORDER — PRAVASTATIN SODIUM 10 MG/1
10 TABLET ORAL DAILY
Status: DISCONTINUED | OUTPATIENT
Start: 2019-11-19 | End: 2019-11-20 | Stop reason: HOSPADM

## 2019-11-19 RX ADMIN — AMLODIPINE BESYLATE 10 MG: 10 TABLET ORAL at 10:11

## 2019-11-19 RX ADMIN — AZTREONAM 1000 MG: 1 INJECTION, POWDER, LYOPHILIZED, FOR SOLUTION INTRAMUSCULAR; INTRAVENOUS at 02:11

## 2019-11-19 RX ADMIN — AZTREONAM 1000 MG: 1 INJECTION, POWDER, LYOPHILIZED, FOR SOLUTION INTRAMUSCULAR; INTRAVENOUS at 12:11

## 2019-11-19 RX ADMIN — ASPIRIN 81 MG: 81 TABLET, COATED ORAL at 10:11

## 2019-11-19 RX ADMIN — SODIUM CHLORIDE: 0.9 INJECTION, SOLUTION INTRAVENOUS at 02:11

## 2019-11-19 RX ADMIN — SERTRALINE HYDROCHLORIDE 50 MG: 50 TABLET ORAL at 10:11

## 2019-11-19 RX ADMIN — PRAVASTATIN SODIUM 10 MG: 10 TABLET ORAL at 10:11

## 2019-11-19 RX ADMIN — SODIUM CHLORIDE: 0.9 INJECTION, SOLUTION INTRAVENOUS at 10:11

## 2019-11-19 RX ADMIN — SULFAMETHOXAZOLE AND TRIMETHOPRIM 1 TABLET: 800; 160 TABLET ORAL at 05:11

## 2019-11-19 NOTE — NURSING
Patient arrived from ER. VSS stable, cardiac monitor applied, provider notified of patient's arrival. Patient resting comfortably in bed.

## 2019-11-19 NOTE — ASSESSMENT & PLAN NOTE
--suspect orthostatic syncope/near syncope given renal failure from dehydration  --CT head unremarkable.   --neuro checks    11/19  AMS resolved  Near Syncope related to metabolic derrangement

## 2019-11-19 NOTE — ASSESSMENT & PLAN NOTE
--bilateral nephrostomy tubes with purulent drainage noted; followed by Dr. Ambrosio at [place].  --IV Aztreonam  --follow urine culture  --blood cultures  --consider Urology consult

## 2019-11-19 NOTE — PLAN OF CARE
CM met with patient and daughter Margareth (POA) at bedside to assess for discharge needs. Pts daughter states that her mother lives at home with her other sister Raissa. She is dependent on walker and wheelchair at times for assistance. She also uses a glucometer and has nephrostomies. Pt daughter is requesting assistance at home with Home Health. Provider list given and pt chose [facility]. CM provided a transitional care folder, information on advanced directives, information on pharmacy bedside delivery, and discharge planning begins on admission with contact information for any needs/questions.    D/C Plan: Home Health  PCP: Milo Gil MD  Preferred Pharmacy: [facility]  Discharge transportation: Family  My [facility]: Declines  Pharmacy Bedside Delivery: Yes       11/19/19 3359   Discharge Assessment   Assessment Type Discharge Planning Assessment   Confirmed/corrected address and phone number on facesheet? Yes   Assessment information obtained from? Patient;Caregiver   Expected Length of Stay (days)   (TBD)   Communicated expected length of stay with patient/caregiver yes   Prior to hospitilization cognitive status: Alert/Oriented   Prior to hospitalization functional status: Independent;Assistive Equipment   Current cognitive status: Alert/Oriented   Current Functional Status: Independent;Assistive Equipment   Facility Arrived From: Home   Lives With child(denisha), adult   Able to Return to Prior Arrangements yes   Is patient able to care for self after discharge? Unable to determine at this time (comments)   Who are your caregiver(s) and their phone number(s)? Margareth Nicholson, Daughter- [phone]; Raissa Hamm, Daughter- [phone]   Patient's perception of discharge disposition home health   Readmission Within the Last 30 Days no previous admission in last 30 days   Patient currently being followed by outpatient case management? No   Patient currently receives any other outside agency services? No    Equipment Currently Used at Home glucometer;nebulizer;walker, rolling;wheelchair   Do you have any problems affording any of your prescribed medications? No   Is the patient taking medications as prescribed? yes   Does the patient have transportation home? Yes   Transportation Anticipated family or friend will provide   Dialysis Name and Scheduled days NA   Does the patient receive services at the [facility]? No   Discharge Plan A Home with family;Home Health   DME Needed Upon Discharge  none   Patient/Family in Agreement with Plan yes

## 2019-11-19 NOTE — HPI
Jessica Hamm is a 84 year old female with history of HTN, CVA, DM, dementia, and COPD who presents to ED for further evaluation of progressive weakness and questionable syncopal episode. Daughter, Edilma, states that she has been having little oral intake over the last three days. Associated symptoms include slow verbal response and following commands. This morning, the daughter had to catch her from falling while standing in the restroom. She feels that she may have lost consciousness.     In ED, she was noted to have worsening kidney function and abnormal urinalysis. CT head unremarkable. She is a full code. Surrogate decision makers are her daughters, Ivelisse and Edilma.

## 2019-11-19 NOTE — ED NOTES
Patient cleaned and linens changed. Emptied 400 ml yellow urine from right urostomy bag. Family at bedside.

## 2019-11-19 NOTE — PROGRESS NOTES
[facility] Hospital Medicine  Progress Note    Patient Name: Jessica Hamm  MRN: [id number]  Patient Class: IP- Inpatient   Admission Date: 11/18/2019  Length of Stay: 0 days  Attending Physician: Arvin Pastor MD  Primary Care Provider: Milo Gil MD        Subjective:     Principal Problem:Urinary tract infection associated with indwelling urethral catheter        HPI:  Jessica Hamm is a 84 year old female with history of HTN, CVA, DM, dementia, and COPD who presents to ED for further evaluation of progressive weakness and questionable syncopal episode. Daughter, Edilma, states that she has been having little oral intake over the last three days. Associated symptoms include slow verbal response and following commands. This morning, the daughter had to catch her from falling while standing in the restroom. She feels that she may have lost consciousness.     In ED, she was noted to have worsening kidney function and abnormal urinalysis. CT head unremarkable. She is a full code. Surrogate decision makers are her daughters, Ivelisse and Edilma.       Overview/Hospital Course:  11/19  Patient improved mentation. Answering questions appropriately. Family at bedside. Patient denies CP/SOB / Discomfort. Patient sees a Urologist at [facility]. Abx infusing. Urology consulted for chronic UTI / B/L nephrostomy tubes. Family reports she is looking, feeling, and acting better than she has in a long time following treatments. D/C planning in progress.    Interval History: Patient with improvement s/p abx / fluids. Patient evaluated by Urology - Recommendation to follow up with her doc at [facility] no acute intervention indicated.   Review of Systems   Constitutional: Positive for activity change and fatigue. Negative for unexpected weight change.   HENT: Negative.  Negative for trouble swallowing.    Eyes: Negative.    Respiratory: Negative.  Negative for cough, shortness of breath and  wheezing.    Cardiovascular: Negative.  Negative for chest pain.   Gastrointestinal: Negative.    Endocrine: Negative.    Genitourinary: Negative.    Musculoskeletal: Negative.    Skin: Negative.    Allergic/Immunologic: Negative.    Neurological: Positive for weakness. Negative for dizziness.   Hematological: Negative.    Psychiatric/Behavioral: Negative.  Negative for behavioral problems, confusion, dysphoric mood and hallucinations.   All other systems reviewed and are negative.    Objective:     Vital Signs (Most Recent):  Temp: 98.4 °F (36.9 °C) (11/19/19 0911)  Pulse: 73 (11/19/19 0911)  Resp: 20 (11/19/19 0911)  BP: (!) 162/74 (11/19/19 0911)  SpO2: (!) 94 % (11/19/19 0911) Vital Signs (24h Range):  Temp:  [98.2 °F (36.8 °C)-98.4 °F (36.9 °C)] 98.4 °F (36.9 °C)  Pulse:  [70-83] 73  Resp:  [18-21] 20  SpO2:  [91 %-95 %] 94 %  BP: (112-168)/(42-79) 162/74     Weight: 53.2 kg (117 lb 4.8 oz)  Body mass index is 24.52 kg/m².    Intake/Output Summary (Last 24 hours) at 11/19/2019 1438  Last data filed at 11/19/2019 0826  Gross per 24 hour   Intake 1200 ml   Output 400 ml   Net 800 ml      Physical Exam   Constitutional: She appears lethargic.   Elderly, thin     HENT:   Head: Normocephalic and atraumatic.   Neck: Normal range of motion. Neck supple.   Cardiovascular: Normal rate, regular rhythm and normal heart sounds.   No murmur heard.  Pulmonary/Chest: Effort normal and breath sounds normal. No respiratory distress.   Abdominal: Soft. Bowel sounds are normal. She exhibits no distension. There is no tenderness.   Musculoskeletal: Normal range of motion. She exhibits no edema.   Neurological: She appears lethargic.   Skin: Skin is warm and dry. Capillary refill takes 2 to 3 seconds.   Dry, scaly skin to lower extremities     Psychiatric: She has a normal mood and affect. Her behavior is normal.       Significant Labs:   BMP:   Recent Labs   Lab 11/19/19  0745   GLU 90      K 4.2      CO2 23   BUN 20    CREATININE 1.6*   CALCIUM 8.6*     CBC:   Recent Labs   Lab 11/18/19 2130 11/19/19  0745   WBC 7.98 9.01   HGB 9.9* 10.0*   HCT 31.5* 32.2*    313     CMP:   Recent Labs   Lab 11/18/19 2130 11/19/19  0745    139   K 4.4 4.2    110   CO2 20* 23   * 90   BUN 24* 20   CREATININE 1.7* 1.6*   CALCIUM 8.1* 8.6*   PROT 7.3  --    ALBUMIN 2.7*  --    BILITOT 0.2  --    ALKPHOS 76  --    AST 13  --    ALT 6*  --    ANIONGAP 10 6*   EGFRNONAA 27* 30*     Troponin:   Recent Labs   Lab 11/18/19 2130   TROPONINI 0.006     Urine Studies:   Recent Labs   Lab 11/18/19 2130   COLORU Yellow   APPEARANCEUA Hazy*   PHUR 6.0   SPECGRAV 1.015   PROTEINUA 2+*   GLUCUA Negative   KETONESU Negative   BILIRUBINUA Negative   OCCULTUA 3+*   NITRITE Negative   UROBILINOGEN 1.0   LEUKOCYTESUR 3+*   RBCUA 20*   WBCUA >100*   BACTERIA Moderate*   HYALINECASTS 0     All pertinent labs within the past 24 hours have been reviewed.    Significant Imaging: I have reviewed all pertinent imaging results/findings within the past 24 hours.      Assessment/Plan:      * Urinary tract infection associated with indwelling urethral catheter  --bilateral nephrostomy tubes with purulent drainage noted; followed by Dr. Ambrosio at [place].  --IV Aztreonam  --follow urine culture  --blood cultures  --consider Urology consult    11/19  Urology Consult performed  Add bactrim PO  Follow Cx          Acute renal injury  --IVF's      Essential hypertension  --resume home meds      Type 2 diabetes mellitus with other specified complication  --insulin sliding scale  --HgbA1c pending  --Diabetic Diet    11/19  NISS  Accuchecks  Monitor        VTE Risk Mitigation (From admission, onward)    None                Arvin Pastor MD  Department of Hospital Medicine   [facility]

## 2019-11-19 NOTE — ASSESSMENT & PLAN NOTE
--suspect orthostatic syncope/near syncope given renal failure from dehydration  --CT head unremarkable.   --neuro checks

## 2019-11-19 NOTE — HOSPITAL COURSE
11/19  Patient improved mentation. Answering questions appropriately. Family at bedside. Patient denies CP/SOB / Discomfort. Patient sees a Urologist at [place]. Abx infusing. Urology consulted for chronic UTI / B/L nephrostomy tubes. Family reports she is looking, feeling, and acting better than she has in a long time following treatments. D/C planning in progress.    Patient 83 yo female with b/l nephrostomy tubes presenting with AMS, chronic UTI. Patient improved steadily with IV abx, fluids and management. Patient evaluated by Urology who recommended follow up with her urologist as scheduled. Patient seen and examined today prior to her discharge to home.

## 2019-11-19 NOTE — SUBJECTIVE & OBJECTIVE
Interval History: Patient with improvement s/p abx / fluids. Patient evaluated by Urology - Recommendation to follow up with her doc at [facility] no acute intervention indicated.   Review of Systems   Constitutional: Positive for activity change and fatigue. Negative for unexpected weight change.   HENT: Negative.  Negative for trouble swallowing.    Eyes: Negative.    Respiratory: Negative.  Negative for cough, shortness of breath and wheezing.    Cardiovascular: Negative.  Negative for chest pain.   Gastrointestinal: Negative.    Endocrine: Negative.    Genitourinary: Negative.    Musculoskeletal: Negative.    Skin: Negative.    Allergic/Immunologic: Negative.    Neurological: Positive for weakness. Negative for dizziness.   Hematological: Negative.    Psychiatric/Behavioral: Negative.  Negative for behavioral problems, confusion, dysphoric mood and hallucinations.   All other systems reviewed and are negative.    Objective:     Vital Signs (Most Recent):  Temp: 98.4 °F (36.9 °C) (11/19/19 0911)  Pulse: 73 (11/19/19 0911)  Resp: 20 (11/19/19 0911)  BP: (!) 162/74 (11/19/19 0911)  SpO2: (!) 94 % (11/19/19 0911) Vital Signs (24h Range):  Temp:  [98.2 °F (36.8 °C)-98.4 °F (36.9 °C)] 98.4 °F (36.9 °C)  Pulse:  [70-83] 73  Resp:  [18-21] 20  SpO2:  [91 %-95 %] 94 %  BP: (112-168)/(42-79) 162/74     Weight: 53.2 kg (117 lb 4.8 oz)  Body mass index is 24.52 kg/m².    Intake/Output Summary (Last 24 hours) at 11/19/2019 1438  Last data filed at 11/19/2019 0826  Gross per 24 hour   Intake 1200 ml   Output 400 ml   Net 800 ml      Physical Exam   Constitutional: She appears lethargic.   Elderly, thin     HENT:   Head: Normocephalic and atraumatic.   Neck: Normal range of motion. Neck supple.   Cardiovascular: Normal rate, regular rhythm and normal heart sounds.   No murmur heard.  Pulmonary/Chest: Effort normal and breath sounds normal. No respiratory distress.   Abdominal: Soft. Bowel sounds are normal. She exhibits no  distension. There is no tenderness.   Musculoskeletal: Normal range of motion. She exhibits no edema.   Neurological: She appears lethargic.   Skin: Skin is warm and dry. Capillary refill takes 2 to 3 seconds.   Dry, scaly skin to lower extremities     Psychiatric: She has a normal mood and affect. Her behavior is normal.       Significant Labs:   BMP:   Recent Labs   Lab 11/19/19 0745   GLU 90      K 4.2      CO2 23   BUN 20   CREATININE 1.6*   CALCIUM 8.6*     CBC:   Recent Labs   Lab 11/18/19 2130 11/19/19 0745   WBC 7.98 9.01   HGB 9.9* 10.0*   HCT 31.5* 32.2*    313     CMP:   Recent Labs   Lab 11/18/19 2130 11/19/19 0745    139   K 4.4 4.2    110   CO2 20* 23   * 90   BUN 24* 20   CREATININE 1.7* 1.6*   CALCIUM 8.1* 8.6*   PROT 7.3  --    ALBUMIN 2.7*  --    BILITOT 0.2  --    ALKPHOS 76  --    AST 13  --    ALT 6*  --    ANIONGAP 10 6*   EGFRNONAA 27* 30*     Troponin:   Recent Labs   Lab 11/18/19 2130   TROPONINI 0.006     Urine Studies:   Recent Labs   Lab 11/18/19 2130   COLORU Yellow   APPEARANCEUA Hazy*   PHUR 6.0   SPECGRAV 1.015   PROTEINUA 2+*   GLUCUA Negative   KETONESU Negative   BILIRUBINUA Negative   OCCULTUA 3+*   NITRITE Negative   UROBILINOGEN 1.0   LEUKOCYTESUR 3+*   RBCUA 20*   WBCUA >100*   BACTERIA Moderate*   HYALINECASTS 0     All pertinent labs within the past 24 hours have been reviewed.    Significant Imaging: I have reviewed all pertinent imaging results/findings within the past 24 hours.

## 2019-11-19 NOTE — ED PROVIDER NOTES
"SCRIBE #1 NOTE: I, Adelso Neri, am scribing for, and in the presence of, Nash Guzman MD. I have scribed the entire note.       History     Chief Complaint   Patient presents with    Loss of Consciousness     decreased oral intake     Review of patient's allergies indicates:   Allergen Reactions    Demerol [meperidine] Rash    Penicillins Hives and Rash     "EVEN THE ROOF OF MY MOUTH BROKE OUT"         History of Present Illness     HPI    11/18/2019, 10:36 PM  History obtained from the daughter and patient      History of Present Illness: Jessica Hamm is a 84 y.o. female patient with a PMHx of DM, dementia, recurring bladder/kidney infections, stroke, and HTN who presents to the Emergency Department for evaluation of syncope which onset suddenly immediately PTA. Pt's daughter states she was headed to the bathroom when she suddenly sat her head down on the counter. Pt's daughter grabbed her and guided her to the toilet before pt completely lost consciousness. Symptoms are episodic and moderate in severity. No mitigating or exacerbating factors reported. Associated sxs include decreased oral intake (3 days) and a fever. Patient and daughter deny any head injury, CP, SOB, weakness, dizziness, and all other sxs at this time. No prior Tx reported. No further complaints or concerns at this time.         Arrival mode: EMS    PCP: Milo Gil MD        Past Medical History:  Past Medical History:   Diagnosis Date    Cancer 1974    Cervical    COPD (chronic obstructive pulmonary disease)     Diabetes mellitus     Hypertension     Stroke 1995       Past Surgical History:  Past Surgical History:   Procedure Laterality Date    BACK SURGERY      carpal tunnel Bilateral     CHOLECYSTECTOMY      HYSTERECTOMY      Knee arthroscopy Bilateral          Family History:  History reviewed. No pertinent family history.     Social History:  Social History     Tobacco Use    Smoking status: Never " Smoker    Smokeless tobacco: Never Used   Substance and Sexual Activity    Alcohol use: No    Drug use: No    Sexual activity: Unknown        Review of Systems     Review of Systems   Constitutional: Positive for appetite change (Decreased oral intake 3 days) and fever. Negative for chills.   HENT: Negative for sore throat.         (-) Head injury   Respiratory: Negative for cough and shortness of breath.    Cardiovascular: Negative for chest pain and leg swelling.   Gastrointestinal: Negative for abdominal pain, diarrhea, nausea and vomiting.   Genitourinary: Negative for dysuria.   Musculoskeletal: Negative for back pain, neck pain and neck stiffness.   Skin: Negative for rash and wound.   Neurological: Positive for syncope. Negative for dizziness, weakness, light-headedness, numbness and headaches.   Hematological: Does not bruise/bleed easily.   All other systems reviewed and are negative.     Physical Exam     Initial Vitals [11/18/19 1945]   BP Pulse Resp Temp SpO2   (!) 112/42 72 18 98.2 °F (36.8 °C) (!) 94 %      MAP       --          Physical Exam  Nursing Notes and Vital Signs Reviewed.  Constitutional: Patient is in no acute distress. Well-developed and well-nourished.  Head: Atraumatic. Normocephalic.  Eyes: PERRL. EOM intact. Conjunctivae are not pale. No scleral icterus.  ENT: Mucous membranes are dry. Oropharynx is clear and symmetric.    Neck: Supple. Full ROM. No lymphadenopathy.  Cardiovascular: Regular rate. Regular rhythm. No murmurs, rubs, or gallops. Distal pulses are 2+ and symmetric.  Pulmonary/Chest: No respiratory distress. Clear to auscultation bilaterally. No wheezing or rales.  Abdominal: Soft and non-distended.  There is no tenderness.  No rebound, guarding, or rigidity. Good bowel sounds.  Genitourinary: No CVA tenderness. Bilateral nephrostomy tubes noted with cloudy urine.  Musculoskeletal: Moves all extremities. No obvious deformities. No edema. No calf tenderness.  Skin: Warm  "and dry.  Neurological:  Alert, awake, and appropriate.  Normal speech.  No acute focal neurological deficits are appreciated.  Psychiatric: Normal affect. Good eye contact. Appropriate in content.     ED Course   Procedures  ED Vital Signs:  Vitals:    11/18/19 1945 11/18/19 2100 11/18/19 2108 11/18/19 2112   BP: (!) 112/42 130/62     Pulse: 72 77 71 71   Resp: 18 20 18 20   Temp: 98.2 °F (36.8 °C)      TempSrc: Oral      SpO2: (!) 94% (!) 92% (!) 93% (!) 92%   Weight:    53.2 kg (117 lb 4.8 oz)   Height: 4' 10" (1.473 m)       11/18/19 2210 11/18/19 2300 11/18/19 2325 11/19/19 0100   BP:  136/67  (!) 144/64   Pulse: 71 70 73 73   Resp: 18 19 18 20   Temp:   98.2 °F (36.8 °C)    TempSrc:       SpO2: (!) 92% (!) 91% (!) 92% 95%   Weight:       Height:        11/19/19 0127 11/19/19 0700 11/19/19 0720 11/19/19 0758   BP:  (!) 168/77  (!) 161/79   Pulse: 78 76 77 81   Resp: 20 20  (!) 21   Temp:       TempSrc:       SpO2: (!) 93% (!) 92%  (!) 91%   Weight:       Height:        11/19/19 0800 11/19/19 0911   BP: (!) 153/70 (!) 162/74   Pulse: 83 73   Resp: (!) 21 20   Temp:  98.4 °F (36.9 °C)   TempSrc:  Oral   SpO2: (!) 92% (!) 94%   Weight:     Height:         Abnormal Lab Results:  Labs Reviewed   COMPREHENSIVE METABOLIC PANEL - Abnormal; Notable for the following components:       Result Value    CO2 20 (*)     Glucose 137 (*)     BUN, Bld 24 (*)     Creatinine 1.7 (*)     Calcium 8.1 (*)     Albumin 2.7 (*)     ALT 6 (*)     eGFR if  32 (*)     eGFR if non  27 (*)     All other components within normal limits   CBC W/ AUTO DIFFERENTIAL - Abnormal; Notable for the following components:    RBC 3.43 (*)     Hemoglobin 9.9 (*)     Hematocrit 31.5 (*)     Mean Corpuscular Hemoglobin Conc 31.4 (*)     Lymph% 14.8 (*)     Basophil% 2.4 (*)     All other components within normal limits   URINALYSIS, REFLEX TO URINE CULTURE - Abnormal; Notable for the following components:    Appearance, UA " Hazy (*)     Protein, UA 2+ (*)     Occult Blood UA 3+ (*)     Leukocytes, UA 3+ (*)     All other components within normal limits    Narrative:     Preferred Collection Type->Urine, Clean Catch   URINALYSIS MICROSCOPIC - Abnormal; Notable for the following components:    RBC, UA 20 (*)     WBC, UA >100 (*)     Bacteria Moderate (*)     All other components within normal limits    Narrative:     Preferred Collection Type->Urine, Clean Catch   CBC W/ AUTO DIFFERENTIAL - Abnormal; Notable for the following components:    RBC 3.46 (*)     Hemoglobin 10.0 (*)     Hematocrit 32.2 (*)     Mean Corpuscular Hemoglobin Conc 31.1 (*)     Basophil% 2.0 (*)     All other components within normal limits   CULTURE, URINE   CULTURE, BLOOD   CULTURE, BLOOD   TROPONIN I   TSH        All Lab Results:  Results for orders placed or performed during the hospital encounter of 11/18/19   Comprehensive metabolic panel   Result Value Ref Range    Sodium 136 136 - 145 mmol/L    Potassium 4.4 3.5 - 5.1 mmol/L    Chloride 106 95 - 110 mmol/L    CO2 20 (L) 23 - 29 mmol/L    Glucose 137 (H) 70 - 110 mg/dL    BUN, Bld 24 (H) 8 - 23 mg/dL    Creatinine 1.7 (H) 0.5 - 1.4 mg/dL    Calcium 8.1 (L) 8.7 - 10.5 mg/dL    Total Protein 7.3 6.0 - 8.4 g/dL    Albumin 2.7 (L) 3.5 - 5.2 g/dL    Total Bilirubin 0.2 0.1 - 1.0 mg/dL    Alkaline Phosphatase 76 55 - 135 U/L    AST 13 10 - 40 U/L    ALT 6 (L) 10 - 44 U/L    Anion Gap 10 8 - 16 mmol/L    eGFR if African American 32 (A) >60 mL/min/1.73 m^2    eGFR if non African American 27 (A) >60 mL/min/1.73 m^2   CBC auto differential   Result Value Ref Range    WBC 7.98 3.90 - 12.70 K/uL    RBC 3.43 (L) 4.00 - 5.40 M/uL    Hemoglobin 9.9 (L) 12.0 - 16.0 g/dL    Hematocrit 31.5 (L) 37.0 - 48.5 %    Mean Corpuscular Volume 92 82 - 98 fL    Mean Corpuscular Hemoglobin 28.9 27.0 - 31.0 pg    Mean Corpuscular Hemoglobin Conc 31.4 (L) 32.0 - 36.0 g/dL    RDW 12.6 11.5 - 14.5 %    Platelets 323 150 - 350 K/uL    MPV  10.0 9.2 - 12.9 fL    Immature Granulocytes 0.4 0.0 - 0.5 %    Gran # (ANC) 5.7 1.8 - 7.7 K/uL    Immature Grans (Abs) 0.03 0.00 - 0.04 K/uL    Lymph # 1.2 1.0 - 4.8 K/uL    Mono # 0.5 0.3 - 1.0 K/uL    Eos # 0.4 0.0 - 0.5 K/uL    Baso # 0.19 0.00 - 0.20 K/uL    nRBC 0 0 /100 WBC    Gran% 71.3 38.0 - 73.0 %    Lymph% 14.8 (L) 18.0 - 48.0 %    Mono% 6.6 4.0 - 15.0 %    Eosinophil% 4.5 0.0 - 8.0 %    Basophil% 2.4 (H) 0.0 - 1.9 %    Differential Method Automated    Troponin I   Result Value Ref Range    Troponin I 0.006 0.000 - 0.026 ng/mL   Urinalysis, Reflex to Urine Culture Urine, Clean Catch   Result Value Ref Range    Specimen UA Urine, Ileal Loop     Color, UA Yellow Yellow, Straw, Jesi    Appearance, UA Hazy (A) Clear    pH, UA 6.0 5.0 - 8.0    Specific Gravity, UA 1.015 1.005 - 1.030    Protein, UA 2+ (A) Negative    Glucose, UA Negative Negative    Ketones, UA Negative Negative    Bilirubin (UA) Negative Negative    Occult Blood UA 3+ (A) Negative    Nitrite, UA Negative Negative    Urobilinogen, UA 1.0 <2.0 EU/dL    Leukocytes, UA 3+ (A) Negative   TSH   Result Value Ref Range    TSH 2.833 0.400 - 4.000 uIU/mL   Urinalysis Microscopic   Result Value Ref Range    RBC, UA 20 (H) 0 - 4 /hpf    WBC, UA >100 (H) 0 - 5 /hpf    Bacteria Moderate (A) None-Occ /hpf    Hyaline Casts, UA 0 0-1/lpf /lpf    Microscopic Comment SEE COMMENT    CBC auto differential   Result Value Ref Range    WBC 9.01 3.90 - 12.70 K/uL    RBC 3.46 (L) 4.00 - 5.40 M/uL    Hemoglobin 10.0 (L) 12.0 - 16.0 g/dL    Hematocrit 32.2 (L) 37.0 - 48.5 %    Mean Corpuscular Volume 93 82 - 98 fL    Mean Corpuscular Hemoglobin 28.9 27.0 - 31.0 pg    Mean Corpuscular Hemoglobin Conc 31.1 (L) 32.0 - 36.0 g/dL    RDW 12.6 11.5 - 14.5 %    Platelets 313 150 - 350 K/uL    MPV 9.4 9.2 - 12.9 fL    Immature Granulocytes 0.3 0.0 - 0.5 %    Gran # (ANC) 5.8 1.8 - 7.7 K/uL    Immature Grans (Abs) 0.03 0.00 - 0.04 K/uL    Lymph # 1.8 1.0 - 4.8 K/uL    Mono # 0.7  0.3 - 1.0 K/uL    Eos # 0.5 0.0 - 0.5 K/uL    Baso # 0.18 0.00 - 0.20 K/uL    nRBC 0 0 /100 WBC    Gran% 64.8 38.0 - 73.0 %    Lymph% 20.0 18.0 - 48.0 %    Mono% 7.5 4.0 - 15.0 %    Eosinophil% 5.4 0.0 - 8.0 %    Basophil% 2.0 (H) 0.0 - 1.9 %    Differential Method Automated    Basic metabolic panel   Result Value Ref Range    Sodium 139 136 - 145 mmol/L    Potassium 4.2 3.5 - 5.1 mmol/L    Chloride 110 95 - 110 mmol/L    CO2 23 23 - 29 mmol/L    Glucose 90 70 - 110 mg/dL    BUN, Bld 20 8 - 23 mg/dL    Creatinine 1.6 (H) 0.5 - 1.4 mg/dL    Calcium 8.6 (L) 8.7 - 10.5 mg/dL    Anion Gap 6 (L) 8 - 16 mmol/L    eGFR if African American 34 (A) >60 mL/min/1.73 m^2    eGFR if non African American 30 (A) >60 mL/min/1.73 m^2         Imaging Results:  Imaging Results          CT Head Without Contrast (Final result)  Result time 11/19/19 06:59:04    Final result by Gunner Leija MD (11/19/19 06:59:04)                 Impression:      Chronic microvascular ischemic changes.    Diffuse opacification of the sinuses with complete opacification left maxillary sinus and left sphenoid sinus. Fluid level suspected within the right sphenoid sinus concerning for acute sinusitis.    Motion limited.      Electronically signed by: Gunner Leija MD  Date:    11/19/2019  Time:    06:59             Narrative:    EXAMINATION:  CT HEAD WITHOUT CONTRAST    CLINICAL HISTORY:  Confusion/delirium, altered LOC, unexplained;    TECHNIQUE:  Low dose axial CT images obtained throughout the head without intravenous contrast. Sagittal and coronal reconstructions were performed.  All CT scans at this facility use dose modulation, iterative reconstruction and/or weight based dosing when appropriate to reduce radiation dose to as low as reasonably achievable.    COMPARISON:  10/12/2017.    FINDINGS:  Intracranial compartment: Motion limited.    The brain parenchyma demonstrates areas of decreased attenuation with moderate periventricular white matter  consistent with chronic microvascular ischemic changes.  Remote bilateral lacunar infarcts..  No parenchymal mass, hemorrhage, edema or major vascular distribution infarct.  Vascular calcifications are noted.    Mild prominence of the sulci and ventricles are consistent with age-related involutional changes.    No extra-axial blood or fluid collections.    Skull/extracranial contents (limited evaluation): No fracture. Diffuse opacification of the sinuses with complete opacification left maxillary sinus and left sphenoid sinus.  Fluid level suspected within the right sphenoid sinus concerning for acute sinusitis.  Mastoid air cells are clear.                            2:42 AM: Per STAT radiology, pt's CT Head without Intravenous contrast results:  Radiologist: John Munoz MD  IMPRESSION:  1. No acute infarct, hemorrhage, mass, or edema.  2. Near-complete opacification of the paranasal sinuses.    The EKG was ordered, reviewed, and independently interpreted by the ED provider.  Interpretation time: 2154  Rate: 75 BPM  Rhythm: normal sinus rhythm  Interpretation: Left axis deviation. Nonspecific T wave abnormality. No STEMI.           The Emergency Provider reviewed the vital signs and test results, which are outlined above.     ED Discussion     2:42 AM: Discussed case with Scott Villafana MD ([facility]). Dr. Villafana agrees with current care and management of pt and accepts admission.   Admitting Service: [facility]  Admitting Physician: Scott Villafana MD  Admit to: Observation/Med-Tele    2:45 AM: Re-evaluated pt. I have discussed test results, shared treatment plan, and the need for admission with patient and family at bedside. Pt and family express understanding at this time and agree with all information. All questions answered. Pt and family have no further questions or concerns at this time. Pt is ready for admit.       Medical Decision Making:   Clinical Tests:   Lab Tests: Ordered and  Reviewed  Radiological Study: Ordered and Reviewed  Medical Tests: Ordered and Reviewed           ED Medication(s):  Medications   0.9%  NaCl infusion ( Intravenous New Bag 11/19/19 0200)   aztreonam 1 g in dextrose 5 % 50 mL IVPB (ready to mix system) (has no administration in time range)   amLODIPine tablet 10 mg (has no administration in time range)   aspirin EC tablet 81 mg (has no administration in time range)   pravastatin tablet 10 mg (has no administration in time range)   sertraline tablet 50 mg (has no administration in time range)   levoFLOXacin 750 mg/150 mL IVPB 750 mg (0 mg Intravenous Stopped 11/19/19 0129)   lactated ringers bolus 1,000 mL (0 mLs Intravenous Stopped 11/19/19 0200)   aztreonam 1 g in dextrose 5 % 50 mL IVPB (ready to mix system) (0 mg Intravenous Stopped 11/19/19 0300)       Current Discharge Medication List                  Scribe Attestation:   Scribe #1: I performed the above scribed service and the documentation accurately describes the services I performed. I attest to the accuracy of the note.     Attending:   Physician Attestation Statement for Scribe #1: I, Nash Gumzan MD, personally performed the services described in this documentation, as scribed by Adelso He, in my presence, and it is both accurate and complete.           Clinical Impression       ICD-10-CM ICD-9-CM   1. Acute UTI (urinary tract infection) N39.0 599.0   2. Loss of consciousness R40.20 780.09   3. Encephalopathy acute G93.40 348.30   4. Creatinine elevation R79.89 790.99       Disposition:   Disposition: Placed in Observation  Condition: Stable         Nash Guzman MD  11/19/19 9378

## 2019-11-19 NOTE — SUBJECTIVE & OBJECTIVE
"Past Medical History:   Diagnosis Date    Cancer 1974    Cervical    COPD (chronic obstructive pulmonary disease)     Diabetes mellitus     Hypertension     Stroke 1995       Past Surgical History:   Procedure Laterality Date    BACK SURGERY      carpal tunnel Bilateral     CHOLECYSTECTOMY      HYSTERECTOMY      Knee arthroscopy Bilateral        Review of patient's allergies indicates:   Allergen Reactions    Demerol [meperidine] Rash    Penicillins Hives and Rash     "EVEN THE ROOF OF MY MOUTH BROKE OUT"       No current facility-administered medications on file prior to encounter.      Current Outpatient Medications on File Prior to Encounter   Medication Sig    albuterol (PROVENTIL) 2.5 mg /3 mL (0.083 %) nebulizer solution Inhale 2.5 mg into the lungs.    amlodipine (NORVASC) 10 MG tablet Take 10 mg by mouth once daily.     aspirin (ECOTRIN) 81 MG EC tablet Take 81 mg by mouth once daily.     carvedilol (COREG) 6.25 MG tablet Take 6.25 mg by mouth.    ferrous sulfate 324 mg (65 mg iron) TbEC Take 324 mg by mouth 2 (two) times daily.     fluticasone-vilanterol (BREO) 200-25 mcg/dose DsDv diskus inhaler Inhale 1 puff into the lungs.    hyoscyamine (LEVSIN/SL) 0.125 mg Subl TAKE 1 TABLET (0.125 MG TOTAL) BY MOUTH EVERY 4 (FOUR) HOURS AS NEEDED FOR CRAMPING (BLADDER SPASMS,    metformin (GLUCOPHAGE) 500 MG tablet 500 mg daily with breakfast.     nebulizer accessories Kit by Miscellaneous route. Use as directed    omeprazole (PRILOSEC) 40 MG capsule     pravastatin (PRAVACHOL) 10 MG tablet Take 10 mg by mouth once daily.     sertraline (ZOLOFT) 25 MG tablet Take 50 mg by mouth once daily.      Family History     None        Tobacco Use    Smoking status: Never Smoker    Smokeless tobacco: Never Used   Substance and Sexual Activity    Alcohol use: No    Drug use: No    Sexual activity: Not on file     Review of Systems   Unable to perform ROS: Mental status change     Objective:     Vital " Signs (Most Recent):  Temp: 98.2 °F (36.8 °C) (11/18/19 2325)  Pulse: 78 (11/19/19 0127)  Resp: 20 (11/19/19 0127)  BP: (!) 144/64 (11/19/19 0100)  SpO2: (!) 93 % (11/19/19 0127) Vital Signs (24h Range):  Temp:  [98.2 °F (36.8 °C)] 98.2 °F (36.8 °C)  Pulse:  [70-78] 78  Resp:  [18-20] 20  SpO2:  [91 %-95 %] 93 %  BP: (112-144)/(42-67) 144/64     Weight: 53.2 kg (117 lb 4.8 oz)  Body mass index is 24.52 kg/m².    Physical Exam   Constitutional: She appears lethargic.   Elderly, thin     HENT:   Head: Normocephalic and atraumatic.   Neck: Normal range of motion. Neck supple.   Cardiovascular: Normal rate, regular rhythm and normal heart sounds.   No murmur heard.  Pulmonary/Chest: Effort normal and breath sounds normal. No respiratory distress.   Abdominal: Soft. Bowel sounds are normal. She exhibits no distension. There is no tenderness.   Musculoskeletal: Normal range of motion. She exhibits no edema.   Neurological: She appears lethargic.   Skin: Skin is warm and dry.   Dry, scaly skin to lower extremities          Significant Labs:   CBC:   Recent Labs   Lab 11/18/19 2130   WBC 7.98   HGB 9.9*   HCT 31.5*        CMP:   Recent Labs   Lab 11/18/19 2130      K 4.4      CO2 20*   *   BUN 24*   CREATININE 1.7*   CALCIUM 8.1*   PROT 7.3   ALBUMIN 2.7*   BILITOT 0.2   ALKPHOS 76   AST 13   ALT 6*   ANIONGAP 10   EGFRNONAA 27*       Significant Imaging:   Imaging Results          CT Head Without Contrast (In process)

## 2019-11-19 NOTE — ED NOTES
Patient is unable to stand at the bedside  Orthostatics    Lying  161/79-82 at 0758    Sitting  153/70-84

## 2019-11-19 NOTE — H&P
"[facility] Hospital Medicine  History & Physical    Patient Name: Jessica Hamm  MRN: [id number]  Admission Date: 11/18/2019  Attending Physician: Nash Guzman MD   Primary Care Provider: Milo Gil MD      Patient information was obtained from patient and ER records.     Subjective:     Principal Problem:Urinary tract infection associated with indwelling urethral catheter    Chief Complaint:   Chief Complaint   Patient presents with    Loss of Consciousness     decreased oral intake        HPI: Jessica Hamm is a 84 year old female with history of HTN, CVA, DM, dementia, and COPD who presents to ED for further evaluation of progressive weakness and questionable syncopal episode. Daughter, Edilma, states that she has been having little oral intake over the last three days. Associated symptoms include slow verbal response and following commands. This morning, the daughter had to catch her from falling while standing in the restroom. She feels that she may have lost consciousness.     In ED, she was noted to have worsening kidney function and abnormal urinalysis. CT head unremarkable. She is a full code. Surrogate decision makers are her daughters, Ivelisse and Edilma.       Past Medical History:   Diagnosis Date    Cancer 1974    Cervical    COPD (chronic obstructive pulmonary disease)     Diabetes mellitus     Hypertension     Stroke 1995       Past Surgical History:   Procedure Laterality Date    BACK SURGERY      carpal tunnel Bilateral     CHOLECYSTECTOMY      HYSTERECTOMY      Knee arthroscopy Bilateral        Review of patient's allergies indicates:   Allergen Reactions    Demerol [meperidine] Rash    Penicillins Hives and Rash     "EVEN THE ROOF OF MY MOUTH BROKE OUT"       No current facility-administered medications on file prior to encounter.      Current Outpatient Medications on File Prior to Encounter   Medication Sig    albuterol (PROVENTIL) 2.5 mg /3 " mL (0.083 %) nebulizer solution Inhale 2.5 mg into the lungs.    amlodipine (NORVASC) 10 MG tablet Take 10 mg by mouth once daily.     aspirin (ECOTRIN) 81 MG EC tablet Take 81 mg by mouth once daily.     carvedilol (COREG) 6.25 MG tablet Take 6.25 mg by mouth.    ferrous sulfate 324 mg (65 mg iron) TbEC Take 324 mg by mouth 2 (two) times daily.     fluticasone-vilanterol (BREO) 200-25 mcg/dose DsDv diskus inhaler Inhale 1 puff into the lungs.    hyoscyamine (LEVSIN/SL) 0.125 mg Subl TAKE 1 TABLET (0.125 MG TOTAL) BY MOUTH EVERY 4 (FOUR) HOURS AS NEEDED FOR CRAMPING (BLADDER SPASMS,    metformin (GLUCOPHAGE) 500 MG tablet 500 mg daily with breakfast.     nebulizer accessories Kit by Miscellaneous route. Use as directed    omeprazole (PRILOSEC) 40 MG capsule     pravastatin (PRAVACHOL) 10 MG tablet Take 10 mg by mouth once daily.     sertraline (ZOLOFT) 25 MG tablet Take 50 mg by mouth once daily.      Family History     None        Tobacco Use    Smoking status: Never Smoker    Smokeless tobacco: Never Used   Substance and Sexual Activity    Alcohol use: No    Drug use: No    Sexual activity: Not on file     Review of Systems   Unable to perform ROS: Mental status change     Objective:     Vital Signs (Most Recent):  Temp: 98.2 °F (36.8 °C) (11/18/19 2325)  Pulse: 78 (11/19/19 0127)  Resp: 20 (11/19/19 0127)  BP: (!) 144/64 (11/19/19 0100)  SpO2: (!) 93 % (11/19/19 0127) Vital Signs (24h Range):  Temp:  [98.2 °F (36.8 °C)] 98.2 °F (36.8 °C)  Pulse:  [70-78] 78  Resp:  [18-20] 20  SpO2:  [91 %-95 %] 93 %  BP: (112-144)/(42-67) 144/64     Weight: 53.2 kg (117 lb 4.8 oz)  Body mass index is 24.52 kg/m².    Physical Exam   Constitutional: She appears lethargic.   Elderly, thin     HENT:   Head: Normocephalic and atraumatic.   Neck: Normal range of motion. Neck supple.   Cardiovascular: Normal rate, regular rhythm and normal heart sounds.   No murmur heard.  Pulmonary/Chest: Effort normal and breath  sounds normal. No respiratory distress.   Abdominal: Soft. Bowel sounds are normal. She exhibits no distension. There is no tenderness.   Musculoskeletal: Normal range of motion. She exhibits no edema.   Neurological: She appears lethargic.   Skin: Skin is warm and dry.   Dry, scaly skin to lower extremities          Significant Labs:   CBC:   Recent Labs   Lab 11/18/19 2130   WBC 7.98   HGB 9.9*   HCT 31.5*        CMP:   Recent Labs   Lab 11/18/19  2130      K 4.4      CO2 20*   *   BUN 24*   CREATININE 1.7*   CALCIUM 8.1*   PROT 7.3   ALBUMIN 2.7*   BILITOT 0.2   ALKPHOS 76   AST 13   ALT 6*   ANIONGAP 10   EGFRNONAA 27*       Significant Imaging:   Imaging Results          CT Head Without Contrast (In process)                     Assessment/Plan:     * Urinary tract infection associated with indwelling urethral catheter  --bilateral nephrostomy tubes with purulent drainage noted; followed by Dr. Ambrosio at [facility].  --IV Aztreonam  --follow urine culture  --blood cultures  --consider Urology consult        Loss of consciousness  --suspect orthostatic syncope/near syncope given renal failure from dehydration  --CT head unremarkable.   --neuro checks      Acute renal injury  --IVF's      Essential hypertension  --resume home meds      Type 2 diabetes mellitus with other specified complication  --insulin sliding scale  --HgbA1c pending  --Diabetic Diet        VTE Risk Mitigation (From admission, onward)    None        Doen Esposito NP  Department of Hospital Medicine   [facility]

## 2019-11-19 NOTE — CONSULTS
Chief Complaint: Bilateral ureteral obstruction    HPI:   11/19/19: 83 yo woman admitted overnight for presumption of UTI likely present on admission secondary to nephrostomies present for relief of chronic bilateral ureteral obstruction and small poorly compliant bladder; last replaced <2 mo ago.  Was admitted secondary to malaise at home after a couple days of poor appetite and low fluid intake on presumption of UTI as the cause.  Reviewed history of Dr. Ambrosio's note in detail current plan of nephrostomies is best plan currently.    Allergies:  Demerol [meperidine]; Penicillins; and Zoloft [sertraline]    Medications: see chart    Review of Systems:  General: No fever, chills, +fatigability, no weight loss.  Dementia.  Skin: No rashes, itching, or changes in color or texture of skin.  Chest: Denies SHAW, cyanosis, wheezing, cough, and sputum production.  Abdomen: Appetite poor. No weight loss. Denies diarrhea, abdominal pain, hematemesis, or blood in stool.  Musculoskeletal: Some joint stiffness or swelling. No back pain.  : As above.  All other review of systems negative.    PMH:   has a past medical history of Cancer (1974), COPD (chronic obstructive pulmonary disease), Diabetes mellitus, Hypertension, and Stroke (1995).    PSH:   has a past surgical history that includes Hysterectomy; Cholecystectomy; Back surgery; Knee arthroscopy (Bilateral); and carpal tunnel (Bilateral).    FamHx: family history is not on file.    SocHx:  reports that she has never smoked. She has never used smokeless tobacco. She reports that she does not drink alcohol or use drugs.     Physical Exam:  Vitals:   Vitals:    11/19/19 0911   BP: (!) 162/74   Pulse: 73   Resp: 20   Temp: 98.4 °F (36.9 °C)     General: A&Ox3. No apparent distress. No deformities.  Neck: No masses. Normal thyroid.  Lungs: normal inspiration. No use of accessory muscles.  Heart: normal pulse. No arrhythmias.  Abdomen: Soft. NT. ND.  Lymphatic: Neck and groin  nodes negative.  Skin: The skin is warm and dry. No jaundice.  Ext: No c/c/e.  : bilateral nephrostomies functional with clear urine    Labs/Studies: see chart    Impression/Plan:   1. No change in ureteral management indicated, and no indications for changing the nephrostomy.  Recc continue followup with home urologist as outpt.    2. Treat UTI in accordance with sensitivities derived from recent urine culture.  3. Discussed likelihood pt has a UTI every day even when acting normal based on the likelihood of persistent UTI with nephrostomy.  Discussed end of life considerations with family; impression is that all efforts to continue longevity are desired.

## 2019-11-19 NOTE — ASSESSMENT & PLAN NOTE
--bilateral nephrostomy tubes with purulent drainage noted; followed by Dr. Ambrosio at [facility].  --IV Aztreonam  --follow urine culture  --blood cultures  --consider Urology consult    11/19  Urology Consult performed  Add bactrim PO  Follow Cx

## 2019-11-20 VITALS
HEIGHT: 58 IN | WEIGHT: 106.25 LBS | BODY MASS INDEX: 22.3 KG/M2 | TEMPERATURE: 99 F | RESPIRATION RATE: 18 BRPM | DIASTOLIC BLOOD PRESSURE: 79 MMHG | SYSTOLIC BLOOD PRESSURE: 166 MMHG | HEART RATE: 95 BPM | OXYGEN SATURATION: 90 %

## 2019-11-20 LAB
BACTERIA UR CULT: NORMAL
BACTERIA UR CULT: NORMAL

## 2019-11-20 PROCEDURE — 25000003 PHARM REV CODE 250: Performed by: NURSE PRACTITIONER

## 2019-11-20 PROCEDURE — 25000003 PHARM REV CODE 250: Performed by: INTERNAL MEDICINE

## 2019-11-20 RX ORDER — SULFAMETHOXAZOLE AND TRIMETHOPRIM 800; 160 MG/1; MG/1
1 TABLET ORAL 2 TIMES DAILY
Qty: 14 TABLET | Refills: 0 | Status: SHIPPED | OUTPATIENT
Start: 2019-11-20

## 2019-11-20 RX ADMIN — PRAVASTATIN SODIUM 10 MG: 10 TABLET ORAL at 10:11

## 2019-11-20 RX ADMIN — ASPIRIN 81 MG: 81 TABLET, COATED ORAL at 10:11

## 2019-11-20 RX ADMIN — AMLODIPINE BESYLATE 10 MG: 10 TABLET ORAL at 10:11

## 2019-11-20 RX ADMIN — SULFAMETHOXAZOLE AND TRIMETHOPRIM 1 TABLET: 800; 160 TABLET ORAL at 10:11

## 2019-11-20 NOTE — PLAN OF CARE
Nov25 Go to Milo Gil MD   Monday Nov 25, 2019   at 11:00 am for a hospital follow up  [facility]   [phone]         11/20/19 1618   Final Note   Assessment Type Final Discharge Note   Anticipated Discharge Disposition Home-Health   Hospital Follow Up  Appt(s) scheduled? Yes   Right Care Referral Info   Post Acute Recommendation Home-care   Facility Name [place], [place]

## 2019-11-20 NOTE — DISCHARGE SUMMARY
[facility] Hospital Medicine  Discharge Summary      Patient Name: Jessica Hamm  MRN: [id number]  Admission Date: 11/18/2019  Hospital Length of Stay: 1 days  Discharge Date and Time: 11/20/2019 11:57 AM  Attending Physician: No att. providers found   Discharging Provider: Arvin Pastor MD  Primary Care Provider: Milo Gil MD      HPI:   Jessica Hamm is a 84 year old female with history of HTN, CVA, DM, dementia, and COPD who presents to ED for further evaluation of progressive weakness and questionable syncopal episode. Daughter, Edilma, states that she has been having little oral intake over the last three days. Associated symptoms include slow verbal response and following commands. This morning, the daughter had to catch her from falling while standing in the restroom. She feels that she may have lost consciousness.     In ED, she was noted to have worsening kidney function and abnormal urinalysis. CT head unremarkable. She is a full code. Surrogate decision makers are her daughters, Ivelisse and Edilma.       * No surgery found *      Hospital Course:   11/19  Patient improved mentation. Answering questions appropriately. Family at bedside. Patient denies CP/SOB / Discomfort. Patient sees a Urologist at [facility]. Abx infusing. Urology consulted for chronic UTI / B/L nephrostomy tubes. Family reports she is looking, feeling, and acting better than she has in a long time following treatments. D/C planning in progress.    Patient 85 yo female with b/l nephrostomy tubes presenting with AMS, chronic UTI. Patient improved steadily with IV abx, fluids and management. Patient evaluated by Urology who recommended follow up with her urologist as scheduled. Patient seen and examined today prior to her discharge to home.     Consults:   Consults (From admission, onward)        Status Ordering Provider     Inpatient consult to Urology  Once     Provider:  Yeyo Nathan     Completed MOIZ VALDES new Assessment & Plan notes have been filed under this hospital service since the last note was generated.  Service: Hospital Medicine    Final Active Diagnoses:    Diagnosis Date Noted POA    PRINCIPAL PROBLEM:  Urinary tract infection associated with indwelling urethral catheter [T83.511A, N39.0] 10/15/2017 Yes    Acute renal injury [N17.9] 11/19/2019 Yes    Type 2 diabetes mellitus with other specified complication [E11.69] 10/13/2017 Yes    Essential hypertension [I10] 10/13/2017 Yes      Problems Resolved During this Admission:    Diagnosis Date Noted Date Resolved POA    Loss of consciousness [R40.20] 11/19/2019 11/19/2019 Yes       Discharged Condition: stable    Disposition: Home-Health Care [facility]    Follow Up:  Follow-up Information     Milo Gil MD. Go on 11/25/2019.    Specialty:  Internal Medicine  Why:  at 11:00 am for a hospital follow up  Contact information:  [address] [address] [address]  [phone]             [facility].    Why:  Home Health  Contact information:  [phone]               Patient Instructions:      Ambulatory referral to Home Health   Referral Priority: Routine Referral Type: Home Health   Referral Reason: Specialty Services Required   Requested Specialty: Home Health Services   Number of Visits Requested: 1     Diet renal     Activity as tolerated       Significant Diagnostic Studies: Labs:   BMP:   Recent Labs   Lab 11/18/19 2130 11/19/19  0745   * 90    139   K 4.4 4.2    110   CO2 20* 23   BUN 24* 20   CREATININE 1.7* 1.6*   CALCIUM 8.1* 8.6*   , CMP   Recent Labs   Lab 11/18/19 2130 11/19/19  0745    139   K 4.4 4.2    110   CO2 20* 23   * 90   BUN 24* 20   CREATININE 1.7* 1.6*   CALCIUM 8.1* 8.6*   PROT 7.3  --    ALBUMIN 2.7*  --    BILITOT 0.2  --    ALKPHOS 76  --    AST 13  --    ALT 6*  --    ANIONGAP 10 6*   ESTGFRAFRICA 32* 34*   EGFRNONAA  27* 30*   , CBC   Recent Labs   Lab 11/18/19 2130 11/19/19  0745   WBC 7.98 9.01   HGB 9.9* 10.0*   HCT 31.5* 32.2*    313   , Troponin   Recent Labs   Lab 11/18/19 2130   TROPONINI 0.006    and All labs within the past 24 hours have been reviewed    Pending Diagnostic Studies:     None         Medications:  Reconciled Home Medications:      Medication List      START taking these medications    sulfamethoxazole-trimethoprim 800-160mg 800-160 mg Tab  Commonly known as:  BACTRIM DS  Take 1 tablet by mouth 2 (two) times daily.        CONTINUE taking these medications    amLODIPine 10 MG tablet  Commonly known as:  NORVASC  Take 10 mg by mouth once daily.     aspirin 81 MG EC tablet  Commonly known as:  ECOTRIN  Take 81 mg by mouth once daily.     memantine 5 MG Tab  Commonly known as:  NAMENDA  Take 5 mg by mouth once daily.     metoprolol succinate 25 MG 24 hr tablet  Commonly known as:  TOPROL-XL  Take 25 mg by mouth once daily.     nebulizer accessories Kit  by Miscellaneous route. Use as directed     pravastatin 10 MG tablet  Commonly known as:  PRAVACHOL  Take 10 mg by mouth once daily.     QUEtiapine 25 MG Tab  Commonly known as:  SEROQUEL  Take by mouth 2 (two) times daily.            Indwelling Lines/Drains at time of discharge:   Lines/Drains/Airways     Drain                 Nephrostomy 11/19/19 1719 Left less than 1 day         Nephrostomy 11/19/19 1719 Right less than 1 day                Time spent on the discharge of patient: 36 minutes  Patient was seen and examined on the date of discharge and determined to be suitable for discharge.         Arvin Pastor MD  Department of Hospital Medicine  [facility]

## 2019-11-20 NOTE — PLAN OF CARE
Patient's orientation improved throughout the day. Per family, patient more awake and talking more. Patient has rested comfortably throughout the day. Switched from IV to PO antibiotics. No other significant changes.

## 2019-11-20 NOTE — NURSING
Went over discharge instructions with patient's family at bedside.   Stressed importance of making and keeping all follow ups as well as making prescribed medication changes.   Prescription x1 delivered to pt bedside via [facility] prior to discharge.  Patient's family verbalized understanding and have had all questions in regards to discharge answered to satisfaction.  IVs removed without complications.  Telemetry box 8660 removed and returned to monitor tech.  Instructed to call nurse station once pt is dressed and ready to be discharged via wheelchair to personal transportation.  Primary nurse notified of pt's discharge status.

## 2019-11-24 LAB
BACTERIA BLD CULT: NORMAL
BACTERIA BLD CULT: NORMAL